# Patient Record
Sex: MALE | Race: WHITE | NOT HISPANIC OR LATINO | Employment: FULL TIME | URBAN - METROPOLITAN AREA
[De-identification: names, ages, dates, MRNs, and addresses within clinical notes are randomized per-mention and may not be internally consistent; named-entity substitution may affect disease eponyms.]

---

## 2017-05-24 ENCOUNTER — ALLSCRIPTS OFFICE VISIT (OUTPATIENT)
Dept: OTHER | Facility: OTHER | Age: 32
End: 2017-05-24

## 2017-06-19 ENCOUNTER — LAB CONVERSION - ENCOUNTER (OUTPATIENT)
Dept: OTHER | Facility: OTHER | Age: 32
End: 2017-06-19

## 2017-06-19 LAB
A/G RATIO (HISTORICAL): 1.6 (CALC) (ref 1–2.5)
ALBUMIN SERPL BCP-MCNC: 4.3 G/DL (ref 3.6–5.1)
ALP SERPL-CCNC: 48 U/L (ref 40–115)
ALT SERPL W P-5'-P-CCNC: 36 U/L (ref 9–46)
AST SERPL W P-5'-P-CCNC: 24 U/L (ref 10–40)
BACTERIA UR QL AUTO: NORMAL /HPF
BASOPHILS # BLD AUTO: 0.5 %
BASOPHILS # BLD AUTO: 30 CELLS/UL (ref 0–200)
BILIRUB SERPL-MCNC: 0.5 MG/DL (ref 0.2–1.2)
BILIRUB UR QL STRIP: NEGATIVE
BUN SERPL-MCNC: 16 MG/DL (ref 7–25)
BUN/CREA RATIO (HISTORICAL): NORMAL (CALC) (ref 6–22)
CALCIUM SERPL-MCNC: 9.4 MG/DL (ref 8.6–10.3)
CHLORIDE SERPL-SCNC: 102 MMOL/L (ref 98–110)
CHOLEST SERPL-MCNC: 195 MG/DL (ref 125–200)
CHOLEST/HDLC SERPL: 5.9 (CALC)
CO2 SERPL-SCNC: 26 MMOL/L (ref 20–31)
COLOR UR: YELLOW
COMMENT (HISTORICAL): CLEAR
CREAT SERPL-MCNC: 1.01 MG/DL (ref 0.6–1.35)
DEPRECATED RDW RBC AUTO: 14.9 % (ref 11–15)
EGFR AFRICAN AMERICAN (HISTORICAL): 114 ML/MIN/1.73M2
EGFR-AMERICAN CALC (HISTORICAL): 98 ML/MIN/1.73M2
EOSINOPHIL # BLD AUTO: 1.4 %
EOSINOPHIL # BLD AUTO: 84 CELLS/UL (ref 15–500)
FECAL OCCULT BLOOD DIAGNOSTIC (HISTORICAL): NEGATIVE
GAMMA GLOBULIN (HISTORICAL): 2.7 G/DL (CALC) (ref 1.9–3.7)
GLUCOSE (HISTORICAL): 95 MG/DL (ref 65–99)
GLUCOSE (HISTORICAL): NEGATIVE
HCT VFR BLD AUTO: 45.9 % (ref 38.5–50)
HDLC SERPL-MCNC: 33 MG/DL
HGB BLD-MCNC: 15.6 G/DL (ref 13.2–17.1)
HYALINE CASTS #/AREA URNS LPF: NORMAL /LPF
KETONES UR STRIP-MCNC: NEGATIVE MG/DL
LDL CHOLESTEROL (HISTORICAL): ABNORMAL MG/DL (CALC)
LDL CHOLESTEROL DIRECT (HISTORICAL): 91 MG/DL
LEUKOCYTE ESTERASE UR QL STRIP: NEGATIVE
LYME IGG/IGM AB (HISTORICAL): <0.9 INDEX
LYMPHOCYTES # BLD AUTO: 1554 CELLS/UL (ref 850–3900)
LYMPHOCYTES # BLD AUTO: 25.9 %
MCH RBC QN AUTO: 27.9 PG (ref 27–33)
MCHC RBC AUTO-ENTMCNC: 34 G/DL (ref 32–36)
MCV RBC AUTO: 82.1 FL (ref 80–100)
MONOCYTES # BLD AUTO: 534 CELLS/UL (ref 200–950)
MONOCYTES (HISTORICAL): 8.9 %
NEUTROPHILS # BLD AUTO: 3798 CELLS/UL (ref 1500–7800)
NEUTROPHILS # BLD AUTO: 63.3 %
NITRITE UR QL STRIP: NEGATIVE
NON-HDL-CHOL (CHOL-HDL) (HISTORICAL): 162 MG/DL (CALC)
PH UR STRIP.AUTO: 6 [PH] (ref 5–8)
PLATELET # BLD AUTO: 195 THOUSAND/UL (ref 140–400)
PMV BLD AUTO: 9 FL (ref 7.5–12.5)
POTASSIUM SERPL-SCNC: 4.5 MMOL/L (ref 3.5–5.3)
PROT UR STRIP-MCNC: NEGATIVE MG/DL
RBC # BLD AUTO: 5.59 MILLION/UL (ref 4.2–5.8)
RBC (HISTORICAL): NORMAL /HPF
SODIUM SERPL-SCNC: 136 MMOL/L (ref 135–146)
SP GR UR STRIP.AUTO: 1.01 (ref 1–1.03)
SQUAMOUS EPITHELIAL CELLS (HISTORICAL): NORMAL /HPF
TOTAL PROTEIN (HISTORICAL): 7 G/DL (ref 6.1–8.1)
TRIGL SERPL-MCNC: 423 MG/DL
TSH SERPL DL<=0.05 MIU/L-ACNC: 2.89 MIU/L (ref 0.4–4.5)
WBC # BLD AUTO: 6 THOUSAND/UL (ref 3.8–10.8)
WBC # BLD AUTO: NORMAL /HPF

## 2017-06-22 ENCOUNTER — ALLSCRIPTS OFFICE VISIT (OUTPATIENT)
Dept: OTHER | Facility: OTHER | Age: 32
End: 2017-06-22

## 2017-06-22 LAB — S PYO AG THROAT QL: POSITIVE

## 2017-08-02 ENCOUNTER — ALLSCRIPTS OFFICE VISIT (OUTPATIENT)
Dept: OTHER | Facility: OTHER | Age: 32
End: 2017-08-02

## 2017-09-20 ENCOUNTER — ALLSCRIPTS OFFICE VISIT (OUTPATIENT)
Dept: OTHER | Facility: OTHER | Age: 32
End: 2017-09-20

## 2018-01-12 VITALS
TEMPERATURE: 98.5 F | BODY MASS INDEX: 42.66 KG/M2 | WEIGHT: 315 LBS | SYSTOLIC BLOOD PRESSURE: 128 MMHG | HEART RATE: 68 BPM | HEIGHT: 72 IN | DIASTOLIC BLOOD PRESSURE: 84 MMHG | RESPIRATION RATE: 16 BRPM

## 2018-01-13 VITALS
RESPIRATION RATE: 16 BRPM | SYSTOLIC BLOOD PRESSURE: 110 MMHG | HEART RATE: 80 BPM | BODY MASS INDEX: 42.66 KG/M2 | DIASTOLIC BLOOD PRESSURE: 88 MMHG | WEIGHT: 315 LBS | HEIGHT: 72 IN

## 2018-01-13 VITALS
RESPIRATION RATE: 16 BRPM | TEMPERATURE: 98.4 F | HEIGHT: 72 IN | WEIGHT: 315 LBS | SYSTOLIC BLOOD PRESSURE: 122 MMHG | DIASTOLIC BLOOD PRESSURE: 88 MMHG | BODY MASS INDEX: 42.66 KG/M2 | HEART RATE: 76 BPM

## 2018-01-14 VITALS
RESPIRATION RATE: 16 BRPM | HEART RATE: 80 BPM | DIASTOLIC BLOOD PRESSURE: 84 MMHG | HEIGHT: 73 IN | SYSTOLIC BLOOD PRESSURE: 122 MMHG | WEIGHT: 315 LBS | BODY MASS INDEX: 41.75 KG/M2

## 2018-02-12 DIAGNOSIS — E78.5 HYPERLIPIDEMIA: ICD-10-CM

## 2018-11-02 ENCOUNTER — OFFICE VISIT (OUTPATIENT)
Dept: FAMILY MEDICINE CLINIC | Facility: CLINIC | Age: 33
End: 2018-11-02
Payer: COMMERCIAL

## 2018-11-02 VITALS
BODY MASS INDEX: 33.29 KG/M2 | SYSTOLIC BLOOD PRESSURE: 120 MMHG | WEIGHT: 251.2 LBS | DIASTOLIC BLOOD PRESSURE: 86 MMHG | TEMPERATURE: 98.4 F | RESPIRATION RATE: 16 BRPM | HEART RATE: 70 BPM | HEIGHT: 73 IN

## 2018-11-02 DIAGNOSIS — J02.9 SORE THROAT: ICD-10-CM

## 2018-11-02 DIAGNOSIS — J02.0 STREP PHARYNGITIS: Primary | ICD-10-CM

## 2018-11-02 LAB — S PYO AG THROAT QL: POSITIVE

## 2018-11-02 PROCEDURE — 3008F BODY MASS INDEX DOCD: CPT | Performed by: PHYSICIAN ASSISTANT

## 2018-11-02 PROCEDURE — 87880 STREP A ASSAY W/OPTIC: CPT | Performed by: PHYSICIAN ASSISTANT

## 2018-11-02 PROCEDURE — 99213 OFFICE O/P EST LOW 20 MIN: CPT | Performed by: PHYSICIAN ASSISTANT

## 2018-11-02 RX ORDER — AZITHROMYCIN 250 MG/1
TABLET, FILM COATED ORAL
Qty: 6 TABLET | Refills: 0 | Status: SHIPPED | OUTPATIENT
Start: 2018-11-02 | End: 2018-11-06

## 2018-11-02 NOTE — PROGRESS NOTES
Assessment/Plan:    1  Strep pharyngitis    - t/c positive for strep, gargle with warm salt water, advil as needed, zpack as directed with food  - azithromycin (ZITHROMAX) 250 mg tablet; Take 2 tabs today and 1 tab for the next 4 days with food  Dispense: 6 tablet; Refill: 0  - POCT rapid strepA  - Throat culture    F/u as needed      Subjective:   Chief Complaint   Patient presents with    Sore Throat    Fever     over 100 last night       Patient ID: Kishor Joaquin is a 34 y o  male  Patient here c/o sore throat and fever starting last night  Temp last night 100-101 F  Slight nasal congestion  Using Afrin  L ear aches  Took day Quil and Ny Quil with some relief  The following portions of the patient's history were reviewed and updated as appropriate: allergies, current medications, past family history, past medical history, past social history, past surgical history and problem list     History reviewed  No pertinent past medical history  Past Surgical History:   Procedure Laterality Date    KNEE SURGERY      right knee ACL reconstruction 1999     Family History   Problem Relation Age of Onset    Appendicitis Mother    [facility] Breast cancer Mother     Other Mother         Kidney transplanted    Diabetes Other      Social History     Social History    Marital status: /Civil Union     Spouse name: N/A    Number of children: N/A    Years of education: N/A     Occupational History    Not on file  Social History Main Topics    Smoking status: Former Smoker    Smokeless tobacco: Never Used    Alcohol use Yes      Comment: drinks beer 1-2 times a week     Drug use: No    Sexual activity: Not on file     Other Topics Concern    Not on file     Social History Narrative    Always uses seat belt    Daily caffeine consumption 1 serving a day    Has smoke detectors         No current outpatient prescriptions on file      Review of Systems          Objective:    Vitals:    11/02/18 1442 BP: 120/86   BP Location: Right arm   Patient Position: Sitting   Cuff Size: Standard   Pulse: 70   Resp: 16   Temp: 98 4 °F (36 9 °C)   TempSrc: Oral   Weight: 114 kg (251 lb 3 2 oz)   Height: 6' 1" (1 854 m)        Physical Exam   Constitutional: He is oriented to person, place, and time  He appears well-developed and well-nourished  HENT:   Head: Normocephalic and atraumatic  Right Ear: Tympanic membrane, external ear and ear canal normal    Left Ear: Tympanic membrane, external ear and ear canal normal    Nose: Nose normal    Mouth/Throat: Uvula is midline  Oropharyngeal exudate and posterior oropharyngeal erythema present  No tonsillar abscesses  Neck: Neck supple  Cardiovascular: Normal rate, regular rhythm and normal heart sounds  Pulmonary/Chest: Effort normal and breath sounds normal    Lymphadenopathy:     He has cervical adenopathy  Neurological: He is alert and oriented to person, place, and time  Psychiatric: He has a normal mood and affect

## 2018-11-02 NOTE — LETTER
November 2, 2018     Patient: Samir Choi   YOB: 1985   Date of Visit: 11/2/2018       To Whom it May Concern:    Emmett Whitney is under my professional care  He was seen in my office on 11/2/2018  He may return to work on 11/5/2018  If you have any questions or concerns, please don't hesitate to call           Sincerely,          Kell Cain PA-C        CC: No Recipients

## 2018-12-02 ENCOUNTER — OFFICE VISIT (OUTPATIENT)
Dept: URGENT CARE | Facility: CLINIC | Age: 33
End: 2018-12-02
Payer: COMMERCIAL

## 2018-12-02 VITALS
HEART RATE: 77 BPM | BODY MASS INDEX: 34.57 KG/M2 | TEMPERATURE: 97.4 F | DIASTOLIC BLOOD PRESSURE: 72 MMHG | SYSTOLIC BLOOD PRESSURE: 127 MMHG | RESPIRATION RATE: 20 BRPM | HEIGHT: 73 IN | OXYGEN SATURATION: 96 % | WEIGHT: 260.8 LBS

## 2018-12-02 DIAGNOSIS — H66.002 ACUTE SUPPURATIVE OTITIS MEDIA OF LEFT EAR WITHOUT SPONTANEOUS RUPTURE OF TYMPANIC MEMBRANE, RECURRENCE NOT SPECIFIED: Primary | ICD-10-CM

## 2018-12-02 PROCEDURE — 99213 OFFICE O/P EST LOW 20 MIN: CPT | Performed by: PHYSICIAN ASSISTANT

## 2018-12-02 RX ORDER — CLARITHROMYCIN 500 MG/1
500 TABLET, COATED ORAL EVERY 12 HOURS SCHEDULED
Qty: 20 TABLET | Refills: 0 | Status: SHIPPED | OUTPATIENT
Start: 2018-12-02 | End: 2018-12-12

## 2018-12-02 RX ORDER — FLUTICASONE PROPIONATE 50 MCG
1 SPRAY, SUSPENSION (ML) NASAL DAILY
Qty: 1 BOTTLE | Refills: 2 | Status: SHIPPED | OUTPATIENT
Start: 2018-12-02 | End: 2018-12-26 | Stop reason: SDUPTHER

## 2018-12-02 NOTE — PROGRESS NOTES
[facility]        NAME: Heidy Jimenez is a 34 y o  male  : 1985    MRN: [id number]  DATE: 2018  TIME: 11:17 AM    Assessment and Plan   Acute suppurative otitis media of left ear without spontaneous rupture of tympanic membrane, recurrence not specified [H66 002]  1  Acute suppurative otitis media of left ear without spontaneous rupture of tympanic membrane, recurrence not specified  clarithromycin (BIAXIN) 500 mg tablet    fluticasone (FLONASE) 50 mcg/act nasal spray         Patient Instructions     Take biaxin twice daily for 10 days  Take with food and eat yogurt or a probiotic daily to decrease GI upset  Flonase daily  Sinus rinses, tylenol, and motrin  Follow up with PCP in 3-5 days  Proceed to  ER if symptoms worsen  Chief Complaint     Chief Complaint   Patient presents with    Sinusitis     patient reports he has had sinus pressure/pain, nasal drainage,bilaterral ear pain for the last 2 days, took dayquil last pm  no fever or chills  History of Present Illness       This is a 34year old male presenting for URI symptoms x 2 days  He reports history of frequent sinus infections and this feels the same  He has sinus congestion, rhinorrhea, facial pain over his maxillary sinuses, flushing over the sinuses  Bilateral ear pain and sore throat, mild dry cough  No fevers  He took Dayquil yesterday which helped his symptoms  Review of Systems   Review of Systems   Constitutional: Positive for chills and fatigue  Negative for fever  HENT: Positive for congestion, ear pain, postnasal drip, rhinorrhea, sinus pain, sinus pressure and sore throat  Negative for ear discharge  Respiratory: Positive for cough  Negative for shortness of breath  Gastrointestinal: Negative for abdominal pain, diarrhea, nausea and vomiting  Musculoskeletal: Negative for myalgias  Skin: Negative for rash  Neurological: Positive for headaches  Negative for dizziness  Current Medications       Current Outpatient Prescriptions:     clarithromycin (BIAXIN) 500 mg tablet, Take 1 tablet (500 mg total) by mouth every 12 (twelve) hours for 10 days, Disp: 20 tablet, Rfl: 0    fluticasone (FLONASE) 50 mcg/act nasal spray, 1 spray into each nostril daily, Disp: 1 Bottle, Rfl: 2    Current Allergies     Allergies as of 12/02/2018 - Reviewed 12/02/2018   Allergen Reaction Noted    Shellfish allergy Anaphylaxis 05/24/2017    Amoxicillin Hives 05/24/2017    Pollen extract Allergic Rhinitis 05/24/2017            The following portions of the patient's history were reviewed and updated as appropriate: allergies, current medications, past family history, past medical history, past social history, past surgical history and problem list      Past Medical History:   Diagnosis Date    Allergic        Past Surgical History:   Procedure Laterality Date    KNEE SURGERY      right knee ACL reconstruction 1999       Family History   Problem Relation Age of Onset    Appendicitis Mother     Breast cancer Mother     Other Mother         Kidney transplanted    Diabetes Other          Medications have been verified  Objective   /72   Pulse 77   Temp (!) 97 4 °F (36 3 °C)   Resp 20   Ht 6' 1" (1 854 m)   Wt 118 kg (260 lb 12 8 oz)   SpO2 96%   BMI 34 41 kg/m²        Physical Exam     Physical Exam   Constitutional: He appears well-developed and well-nourished  No distress  HENT:   Head: Normocephalic  Right Ear: Tympanic membrane, external ear and ear canal normal    Left Ear: Ear canal normal  Tympanic membrane is injected, erythematous and bulging  A middle ear effusion is present  Nose: Mucosal edema and rhinorrhea present  Right sinus exhibits maxillary sinus tenderness  Right sinus exhibits no frontal sinus tenderness  Left sinus exhibits maxillary sinus tenderness  Left sinus exhibits no frontal sinus tenderness     Mouth/Throat: Uvula is midline and mucous membranes are normal  Posterior oropharyngeal erythema (mild posterior pharyngeal erythema without edema  + PND) present  No oropharyngeal exudate or posterior oropharyngeal edema  Left TM is erythematous, bulging, + mid ear effusion  Eyes: Pupils are equal, round, and reactive to light  Conjunctivae and EOM are normal    Neck: Normal range of motion  Neck supple  Cardiovascular: Normal rate, regular rhythm, normal heart sounds and intact distal pulses  Pulmonary/Chest: Effort normal and breath sounds normal  No respiratory distress  He has no decreased breath sounds  He has no wheezes  He has no rhonchi  He has no rales  Lymphadenopathy:     He has no cervical adenopathy  Neurological: He is alert  Skin: Skin is warm and dry  He is not diaphoretic  Nursing note and vitals reviewed

## 2018-12-26 ENCOUNTER — OFFICE VISIT (OUTPATIENT)
Dept: URGENT CARE | Facility: CLINIC | Age: 33
End: 2018-12-26
Payer: COMMERCIAL

## 2018-12-26 VITALS
OXYGEN SATURATION: 96 % | BODY MASS INDEX: 33.93 KG/M2 | RESPIRATION RATE: 16 BRPM | HEIGHT: 73 IN | DIASTOLIC BLOOD PRESSURE: 77 MMHG | HEART RATE: 77 BPM | WEIGHT: 256 LBS | SYSTOLIC BLOOD PRESSURE: 138 MMHG | TEMPERATURE: 97.7 F

## 2018-12-26 DIAGNOSIS — H66.002 ACUTE SUPPURATIVE OTITIS MEDIA OF LEFT EAR WITHOUT SPONTANEOUS RUPTURE OF TYMPANIC MEMBRANE, RECURRENCE NOT SPECIFIED: ICD-10-CM

## 2018-12-26 DIAGNOSIS — J06.9 VIRAL URI WITH COUGH: Primary | ICD-10-CM

## 2018-12-26 PROCEDURE — 99213 OFFICE O/P EST LOW 20 MIN: CPT | Performed by: PHYSICIAN ASSISTANT

## 2018-12-26 RX ORDER — FLUTICASONE PROPIONATE 50 MCG
1 SPRAY, SUSPENSION (ML) NASAL DAILY
Qty: 1 BOTTLE | Refills: 0 | Status: SHIPPED | OUTPATIENT
Start: 2018-12-26 | End: 2020-02-05

## 2018-12-26 NOTE — PROGRESS NOTES
[facility]        NAME: Yosvany Barrientos is a 28 y o  male  : 1985    MRN: [id number]  DATE: 2018  TIME: 5:10 PM    Assessment and Plan   Viral URI with cough [J06 9, B97 89]  1  Viral URI with cough     2  Acute suppurative otitis media of left ear without spontaneous rupture of tympanic membrane, recurrence not specified  fluticasone (FLONASE) 50 mcg/act nasal spray         Patient Instructions       FolloFlonase daily for sinus congestion  Increase fluid intake  Robotussin, sudafed, humidified air for symptomatic relief  Watch for fevers  w up with PCP in 3-5 days  Proceed to  ER if symptoms worsen  Chief Complaint     Chief Complaint   Patient presents with    Cold Like Symptoms     pt was here in the beginning of december for sinus inf and ear infection and prescribed flonase & ABT  Yesterday pt used contaminated flonase bottle and now has blocked sinuses, clogged ears b/l and a productive cough  History of Present Illness       This is a 28year old male presenting for URI symptoms x 2 days  He was seen here earlier this month by me and treated for otitis media  He reports that yesterday he was having some mild sinus congestion, rhinorrhea, and watery eyes and he used his old bottle of flonase  He states that he feels the bottle is contaminated and he gave himself another sinus infection  He is now having sinus congestion, clogged ear sensation, sore throat, and cough  No shortness of breath or fevers  He notes that his daughter is also sick at home for the past 3-4 days  Review of Systems   Review of Systems   Constitutional: Positive for fatigue  Negative for fever  HENT: Positive for congestion, ear pain, postnasal drip, rhinorrhea, sinus pressure and sore throat  Negative for ear discharge  Respiratory: Positive for cough  Negative for shortness of breath and wheezing      Gastrointestinal: Negative for abdominal pain, diarrhea, nausea and vomiting  Skin: Negative for rash  Neurological: Positive for headaches  Current Medications       Current Outpatient Prescriptions:     fluticasone (FLONASE) 50 mcg/act nasal spray, 1 spray into each nostril daily, Disp: 1 Bottle, Rfl: 0    Current Allergies     Allergies as of 12/26/2018 - Reviewed 12/26/2018   Allergen Reaction Noted    Shellfish allergy Anaphylaxis 05/24/2017    Amoxicillin Hives 05/24/2017    Pollen extract Allergic Rhinitis 05/24/2017            The following portions of the patient's history were reviewed and updated as appropriate: allergies, current medications, past family history, past medical history, past social history, past surgical history and problem list      Past Medical History:   Diagnosis Date    Allergic        Past Surgical History:   Procedure Laterality Date    KNEE SURGERY      right knee ACL reconstruction 1999       Family History   Problem Relation Age of Onset    Appendicitis Mother     Breast cancer Mother     Other Mother         Kidney transplanted    No Known Problems Father     Diabetes Other          Medications have been verified  Objective   /77 (BP Location: Left arm, Patient Position: Sitting)   Pulse 77   Temp 97 7 °F (36 5 °C) (Tympanic)   Resp 16   Ht 6' 1" (1 854 m)   Wt 116 kg (256 lb)   SpO2 96%   BMI 33 78 kg/m²        Physical Exam     Physical Exam   Constitutional: He appears well-developed and well-nourished  No distress  HENT:   Head: Normocephalic  Right Ear: Tympanic membrane, external ear and ear canal normal    Left Ear: External ear and ear canal normal    Nose: Mucosal edema and rhinorrhea present  Mouth/Throat: Uvula is midline and mucous membranes are normal  Posterior oropharyngeal erythema (mild) present  No oropharyngeal exudate or posterior oropharyngeal edema  Eyes: Conjunctivae are normal    Neck: Normal range of motion  Neck supple     Cardiovascular: Normal rate, regular rhythm, normal heart sounds and intact distal pulses  Pulmonary/Chest: Effort normal and breath sounds normal  No respiratory distress  He has no decreased breath sounds  He has no wheezes  He has no rhonchi  He has no rales  Lymphadenopathy:     He has no cervical adenopathy  Neurological: He is alert  Skin: Skin is warm and dry  He is not diaphoretic  Nursing note and vitals reviewed

## 2019-02-25 ENCOUNTER — OFFICE VISIT (OUTPATIENT)
Dept: FAMILY MEDICINE CLINIC | Facility: CLINIC | Age: 34
End: 2019-02-25
Payer: COMMERCIAL

## 2019-02-25 VITALS
HEART RATE: 80 BPM | RESPIRATION RATE: 16 BRPM | WEIGHT: 272.1 LBS | TEMPERATURE: 99.2 F | HEIGHT: 72 IN | DIASTOLIC BLOOD PRESSURE: 60 MMHG | BODY MASS INDEX: 36.85 KG/M2 | SYSTOLIC BLOOD PRESSURE: 110 MMHG

## 2019-02-25 DIAGNOSIS — R05.9 COUGH: ICD-10-CM

## 2019-02-25 DIAGNOSIS — J02.0 STREP PHARYNGITIS: Primary | ICD-10-CM

## 2019-02-25 LAB — S PYO AG THROAT QL: NEGATIVE

## 2019-02-25 PROCEDURE — 1036F TOBACCO NON-USER: CPT | Performed by: FAMILY MEDICINE

## 2019-02-25 PROCEDURE — 99214 OFFICE O/P EST MOD 30 MIN: CPT | Performed by: FAMILY MEDICINE

## 2019-02-25 PROCEDURE — 3008F BODY MASS INDEX DOCD: CPT | Performed by: FAMILY MEDICINE

## 2019-02-25 PROCEDURE — 87880 STREP A ASSAY W/OPTIC: CPT | Performed by: FAMILY MEDICINE

## 2019-02-25 RX ORDER — CEFUROXIME AXETIL 500 MG/1
500 TABLET ORAL EVERY 12 HOURS SCHEDULED
Qty: 20 TABLET | Refills: 0 | Status: SHIPPED | OUTPATIENT
Start: 2019-02-25 | End: 2019-03-07

## 2019-02-25 NOTE — PATIENT INSTRUCTIONS
Patient clinically a strep pharyngitis  Patient will take Ceftin twice a day for 10 days  If he has any kind a hives he will let us know but I doubt that he will from his penicillin allergy  He should get better in the next 48-72 hours if he gets worse he will let us now    Recheck as scheduled or needed

## 2019-02-25 NOTE — PROGRESS NOTES
Assessment/Plan:    Patient clinically a strep pharyngitis  Patient will take Ceftin twice a day for 10 days  If he has any kind a hives he will let us know but I doubt that he will from his penicillin allergy  He should get better in the next 48-72 hours if he gets worse he will let us now    Recheck as scheduled or needed         Subjective:   Kishor Joaquin is a 28 y o male  Chief Complaint   Patient presents with    Cold Like Symptoms    Cough     Patient woke up yesterday with a sore throat had all day became worse as the day went on  Last night he had stomach pain and shaking rigors and chills  He went to sleep last night woke up so felt okay but he had a cough now  He went to work any was sent home because he looks so sick  No he has chest pain with pain on the sides from his ribs and he feels miserable  He the biggest things bothering him now our the pain the chest cough and his ears popping  Still with a fever low grade today 99 but shaking rigors earlier in the day      Past Medical History:   Diagnosis Date    Allergic      Social History     Tobacco Use    Smoking status: Former Smoker    Smokeless tobacco: Never Used   Substance Use Topics    Alcohol use: Yes     Comment: drinks beer 1-2 times a week     Drug use: No     Family History   Problem Relation Age of Onset    Appendicitis Mother     Breast cancer Mother     Other Mother         Kidney transplanted    No Known Problems Father     Diabetes Other     Alcohol abuse Neg Hx     Substance Abuse Neg Hx     Mental illness Neg Hx        MEDICATIONS REVIEWED AND UPDATED    Rest Of 10 Point Review Of System Negative    Objective:    Vitals:    02/25/19 1449   BP: 110/60   Pulse: 80   Resp: 16   Temp: 99 2 °F (37 3 °C)     Body mass index is 36 9 kg/m²      Physical Exam  Constitutional  Patient is fatigued in no acute distress    Mental Status  Alert, Oriented, Cooperative, Memory function normal , clean, and reasonable    HEENT  TMs are clear turbinates open red pharynx beefy red questionable odor no exudate    Neck  No neck mass, No thyromegaly, Good carotid upstrokes bilaterally, trachea midline positive click    Respiratory  Breath sounds normal, No rales, No rhonchi, No wheezing, normal palpation    Cardiac   Regular rhythm without ectopy or murmur no S3-S4, no heave lift or thrill to palpation    Vascular  No leg edema, No pedal edema    Muscular skeletal  No clubbing cyanosis , muscle tone normal    Skin  No appreciable rashes or abnormal appearing lesions

## 2019-02-27 LAB — B-HEM STREP SPEC QL CULT: NEGATIVE

## 2020-02-05 ENCOUNTER — APPOINTMENT (EMERGENCY)
Dept: RADIOLOGY | Facility: HOSPITAL | Age: 35
End: 2020-02-05
Payer: COMMERCIAL

## 2020-02-05 ENCOUNTER — HOSPITAL ENCOUNTER (EMERGENCY)
Facility: HOSPITAL | Age: 35
Discharge: HOME | End: 2020-02-05
Attending: EMERGENCY MEDICINE
Payer: COMMERCIAL

## 2020-02-05 ENCOUNTER — OFFICE VISIT (OUTPATIENT)
Dept: FAMILY MEDICINE CLINIC | Facility: CLINIC | Age: 35
End: 2020-02-05
Payer: COMMERCIAL

## 2020-02-05 VITALS
TEMPERATURE: 98 F | SYSTOLIC BLOOD PRESSURE: 135 MMHG | RESPIRATION RATE: 17 BRPM | OXYGEN SATURATION: 95 % | DIASTOLIC BLOOD PRESSURE: 75 MMHG | HEART RATE: 65 BPM

## 2020-02-05 VITALS
SYSTOLIC BLOOD PRESSURE: 120 MMHG | TEMPERATURE: 98.3 F | HEART RATE: 84 BPM | RESPIRATION RATE: 15 BRPM | WEIGHT: 299.6 LBS | DIASTOLIC BLOOD PRESSURE: 76 MMHG | BODY MASS INDEX: 40.58 KG/M2 | HEIGHT: 72 IN

## 2020-02-05 DIAGNOSIS — R51.9 NONINTRACTABLE HEADACHE, UNSPECIFIED CHRONICITY PATTERN, UNSPECIFIED HEADACHE TYPE: Primary | ICD-10-CM

## 2020-02-05 DIAGNOSIS — F32.1 CURRENT MODERATE EPISODE OF MAJOR DEPRESSIVE DISORDER WITHOUT PRIOR EPISODE (HCC): ICD-10-CM

## 2020-02-05 DIAGNOSIS — G43.909 MIGRAINE: Primary | ICD-10-CM

## 2020-02-05 DIAGNOSIS — E66.01 MORBID OBESITY WITH BMI OF 40.0-44.9, ADULT (HCC): ICD-10-CM

## 2020-02-05 DIAGNOSIS — H54.7 LOSS OF VISION: ICD-10-CM

## 2020-02-05 PROCEDURE — 99284 EMERGENCY DEPT VISIT MOD MDM: CPT | Performed by: EMERGENCY MEDICINE

## 2020-02-05 PROCEDURE — 99214 OFFICE O/P EST MOD 30 MIN: CPT | Performed by: PHYSICIAN ASSISTANT

## 2020-02-05 PROCEDURE — 70450 CT HEAD/BRAIN W/O DYE: CPT

## 2020-02-05 PROCEDURE — 1036F TOBACCO NON-USER: CPT | Performed by: PHYSICIAN ASSISTANT

## 2020-02-05 PROCEDURE — 99284 EMERGENCY DEPT VISIT MOD MDM: CPT

## 2020-02-05 PROCEDURE — 3008F BODY MASS INDEX DOCD: CPT | Performed by: PHYSICIAN ASSISTANT

## 2020-02-05 RX ADMIN — DEXAMETHASONE 10 MG: 2 TABLET ORAL at 15:50

## 2020-02-05 NOTE — ED NOTES
Patient ambulatory to perform visual acuity screening with no difficulty      Abimbola Graham RN  02/05/20 8995

## 2020-02-05 NOTE — PROGRESS NOTES
Assessment/Plan:    1  Nonintractable headache, unspecified chronicity pattern, unspecified headache type    - described as worst headache, with loss of vision and now dizziness will send to ER arin schumacher, advised to go home and have wife drive him    2  Loss of vision    - see above    3  Morbid obesity with BMI of 40 0-44 9, adult ([facility] [address] )    - encouraged patient to work on [address]  [employer], exercise  and adequate sleep for weight loss  Follow up if more help is needed    4  Depression    - follow up after ER to discuss treatment    F/u after ER  F/u as needed    Subjective:   Chief Complaint   Patient presents with    Headache    Dizziness    Visual disturbances      Patient ID: Joao Tinoco is a 27 y o  male  Saturday was at a conference and the lights were off and patient was having trouble with vision  Headache then started, behind left eye, took Tylenol went to bed then woke up and felt better then Sunday night headache returned around 5 pm, vision started blurry, weird vision squibbles, spots, trouble seeing  Sunday took advil and had a hot shower  "Felt weird"  Vision went out completely in right eye  Monday felt better had a mild headache, saw spots and resolved and headache only lasted an hour, again happened on Tuesday night at 5-6 pm and had spots in vision again and 10 minutes later headache started and he felt like he got hit by a freight train, head still swimming  Tried to get up to go to work but couldn't get in  No pain in head now  Equilibrium feels off  Trouble to focus  Feels like he has a concussion but didn't hit his head at all  Vision still blurry today  Has lost vision twice in right eye, eye even drooping partially shut per son last night  Headache "worst of his life"  Last migraine was probably late teens  Also admits for the past few months has been feeling down, no motivation, trouble getting out of bed, snapping at work and family  Wife thinks he is depressed   Would like to discuss this        The following portions of the patient's history were reviewed and updated as appropriate: allergies, current medications, past family history, past medical history, past social history, past surgical history and problem list     Past Medical History:   Diagnosis Date    Allergic      Past Surgical History:   Procedure Laterality Date    KNEE SURGERY      right knee ACL reconstruction 1999     Family History   Problem Relation Age of Onset    Appendicitis Mother     Breast cancer Mother     Other Mother         Kidney transplanted    No Known Problems Father     Diabetes Other     Alcohol abuse Neg Hx     Substance Abuse Neg Hx     Mental illness Neg Hx      Social History     Socioeconomic History    Marital status: /Civil Union     Spouse name: Not on file    Number of children: Not on file    Years of education: Not on file    Highest education level: Not on file   Occupational History    Not on file   Social Needs    Financial resource strain: Not on file    Food insecurity:     Worry: Not on file     Inability: Not on file    Transportation needs:     Medical: Not on file     Non-medical: Not on file   Tobacco Use    Smoking status: Former Smoker    Smokeless tobacco: Never Used   Substance and Sexual Activity    Alcohol use: Yes     Comment: drinks beer 1-2 times a week     Drug use: No    Sexual activity: Not on file   Lifestyle    Physical activity:     Days per week: Not on file     Minutes per session: Not on file    Stress: Not on file   Relationships    Social connections:     Talks on phone: Not on file     Gets together: Not on file     Attends Congregation service: Not on file     Active member of club or organization: Not on file     Attends meetings of clubs or organizations: Not on file     Relationship status: Not on file    Intimate partner violence:     Fear of current or ex partner: Not on file     Emotionally abused: Not on file Physically abused: Not on file     Forced sexual activity: Not on file   Other Topics Concern    Not on file   Social History Narrative    Always uses seat belt    Daily caffeine consumption 1 serving a day    Has smoke detectors       Current Outpatient Medications:     fluticasone (FLONASE) 50 mcg/act nasal spray, 1 spray into each nostril daily, Disp: 1 Bottle, Rfl: 0    Review of Systems          Objective:    Vitals:    02/05/20 1254   BP: 120/76   BP Location: Left arm   Patient Position: Sitting   Cuff Size: Large   Pulse: 84   Resp: 15   Temp: 98 3 °F (36 8 °C)   TempSrc: Oral   Weight: 136 kg (299 lb 9 6 oz)   Height: 5' 11 75" (1 822 m)        Physical Exam   Constitutional: He is oriented to person, place, and time  He appears well-developed and well-nourished  HENT:   Head: Normocephalic and atraumatic  Right Ear: Hearing, tympanic membrane, external ear and ear canal normal    Left Ear: Hearing, tympanic membrane, external ear and ear canal normal    Nose: Nose normal    Mouth/Throat: Oropharynx is clear and moist    Eyes: Pupils are equal, round, and reactive to light  Conjunctivae and EOM are normal    Neck: Normal range of motion  Neck supple  No thyromegaly present  Cardiovascular: Normal rate, regular rhythm, normal heart sounds and intact distal pulses  No murmur heard  Pulmonary/Chest: Effort normal and breath sounds normal  No respiratory distress  He has no wheezes  He has no rales  Musculoskeletal: Normal range of motion  He exhibits no edema  Lymphadenopathy:     He has no cervical adenopathy  Neurological: He is alert and oriented to person, place, and time  He has normal reflexes  He displays normal reflexes  No cranial nerve deficit or sensory deficit  He exhibits normal muscle tone  Coordination normal    Skin: Skin is warm and dry  Psychiatric: He has a normal mood and affect  Judgment normal              BMI Counseling: Body mass index is 40 92 kg/m²   The BMI is above normal  Nutrition recommendations include reducing portion sizes, decreasing overall calorie intake and 3-5 servings of fruits/vegetables daily  Exercise recommendations include moderate aerobic physical activity for 150 minutes/week  Depression Screening Follow-up Plan: Patient's depression screening was positive with a PHQ-2 score of 3  Their PHQ-9 score was 10  Patient assessed for underlying major depression  They have no active suicidal ideations  Brief counseling provided and recommend additional follow-up/re-evaluation next office visit

## 2020-02-05 NOTE — ED ATTENDING ATTESTATION
2/5/2020  Dread Masters DO, saw and evaluated the patient  I have discussed the patient with the resident/non-physician practitioner and agree with the resident's/non-physician practitioner's findings, Plan of Care, and MDM as documented in the resident's/non-physician practitioner's note, except where noted  All available labs and Radiology studies were reviewed  I was present for key portions of any procedure(s) performed by the resident/non-physician practitioner and I was immediately available to provide assistance  At this point I agree with the current assessment done in the Emergency Department  I have conducted an independent evaluation of this patient a history and physical is as follows:    26 yo male presents for evaluation of HA starting 2/1/2020 he has been having "migraine" HA nightly, associated with scotoma, HA for a few hours at a time  He had an episode where he felt like he lost vision in L eye, then resolved, the following day it was R eye, then the next day L eye again  States that symptoms improved with tylenol and water  Pt c/o mild HA at this time, but concerned because things seem "wavy"    Denies focal weakness/numbness/tingling  EOMI, PERRL  No dysmetria or dysdiadokinesia  No visual field cuts  No nystagmus  MS 5/5 all ext  CN II-XII grossly intact  Normal gait    Imp: multiple c/o likely relating to ophthalmic migraine plan: tx sx  Offer CT  Pt should f/u with neurology (dr Cr Cantu) for further eval and tx  May need MRI as outpt        ED Course         Critical Care Time  Procedures

## 2020-02-05 NOTE — ED PROVIDER NOTES
History  Chief Complaint   Patient presents with    Headache     Headache starting 5pm since Saturday along with seeing spots  Lost vision in left eye for several hours while having the headache  Had history of migraines when younger in his early 25s  69-year-old male past medical history including migraines in his 25s stating that approximately 3 4 days ago started to have migraines daily again at around the same time at night and has been taking over-the-counter pain medication and having to take naps for relief  Patient states that when he gets these migraines usually has some visual disturbances before it initially describing lighting waving objects in his vision as well as dots in his vision with intermittent visual loss in alternating eyes which immediately returns  Patient denies recent illness, fever, night sweats, chills, sore throat, cough, chest pain with shortness of breath, abdominal pain, vomiting, diarrhea constipation dysuria, hematuria  None       Past Medical History:   Diagnosis Date    Allergic        Past Surgical History:   Procedure Laterality Date    KNEE SURGERY      right knee ACL reconstruction 1999       Family History   Problem Relation Age of Onset    Appendicitis Mother     Other Mother         Kidney transplanted    Lupus Mother     Breast cancer Mother     Raynaud syndrome Mother     Kidney failure Mother     No Known Problems Father     Diabetes Other     Alcohol abuse Neg Hx     Substance Abuse Neg Hx     Mental illness Neg Hx      I have reviewed and agree with the history as documented  Social History     Tobacco Use    Smoking status: Former Smoker    Smokeless tobacco: Never Used   Substance Use Topics    Alcohol use: Yes     Comment: drinks beer 1-2 times a week     Drug use: No        Review of Systems   Constitutional: Negative  HENT: Negative  Eyes: Positive for visual disturbance  Negative for photophobia, pain and discharge  Respiratory: Negative  Cardiovascular: Negative  Gastrointestinal: Negative  Genitourinary: Negative  Musculoskeletal: Negative  Skin: Negative  Neurological: Positive for headaches  Negative for dizziness, seizures, syncope, weakness, light-headedness and numbness  Physical Exam  ED Triage Vitals [02/05/20 1422]   Temperature Pulse Respirations Blood Pressure SpO2   98 °F (36 7 °C) 80 17 149/93 98 %      Temp Source Heart Rate Source Patient Position - Orthostatic VS BP Location FiO2 (%)   Oral Monitor Sitting Left arm --      Pain Score       3             Orthostatic Vital Signs  Vitals:    02/05/20 1422 02/05/20 1557   BP: 149/93 152/67   Pulse: 80 59   Patient Position - Orthostatic VS: Sitting        Physical Exam    ED Medications  Medications   dexamethasone (DECADRON) tablet 10 mg (10 mg Oral Given 2/5/20 1550)       Diagnostic Studies  Results Reviewed     None                 CT head without contrast    (Results Pending)         Procedures  Procedures      ED Course                               MDM  Number of Diagnoses or Management Options  Diagnosis management comments: Will treat patient with Decadron as he does not have a headache at this time is prevention, will get a CT non-con at this time, discussed with patient and patient's family that likely these are migrainous in nature and that he will need to follow-up with neurology and the headache clinic as an outpatient  Will provide patient with information and referral         Disposition  Final diagnoses:   Migraine     Time reflects when diagnosis was documented in both MDM as applicable and the Disposition within this note     Time User Action Codes Description Comment    2/5/2020  4:36 PM Michael Alvarado Add [V09 [id number]] Migraine       ED Disposition     ED Disposition Condition Date/Time Comment    Discharge Stable Wed Feb 5, 2020  4:36 PM [facility] discharge to home/self care              Follow-up Information Follow up With Specialties Details Why Contact Info Additional Information    Hyun Wilkins PA-C Family Medicine, Physician Assistant Go to   [address] [facility], [facility] [address] Max Galvez MD Neurology Schedule an appointment as soon as possible for a visit   [facility] [phone]       [address] Emergency Department Emergency Medicine  As needed, If symptoms worsen [address] [place] [address]  [phone]  ED, [address] [place] [address], [address]   [phone]          Patient's Medications   Discharge Prescriptions    No medications on file         ED Provider  Attending physically available and evaluated Sergey Menard I managed the patient along with the ED Attending      Electronically Signed by         Mary Jefferson DO  02/05/20 1327

## 2020-02-05 NOTE — DISCHARGE INSTRUCTIONS
You came into the ED fr migraine type headaches over the past few days  After shared decision making, a CT head was performed which did not show any acute abnormality  Please follow up with neurology and call the below number to schedule an appointment  Please worsening for worsening headaches, fainting, or any other concerning signs or symptoms  Please refer to the following documents for additional instructions and return precautions

## 2021-07-06 ENCOUNTER — TELEMEDICINE (OUTPATIENT)
Dept: FAMILY MEDICINE CLINIC | Facility: CLINIC | Age: 36
End: 2021-07-06
Payer: COMMERCIAL

## 2021-07-06 VITALS — WEIGHT: 300 LBS | BODY MASS INDEX: 39.76 KG/M2 | HEIGHT: 73 IN

## 2021-07-06 DIAGNOSIS — Z20.822 SUSPECTED COVID-19 VIRUS INFECTION: Primary | ICD-10-CM

## 2021-07-06 PROCEDURE — U0005 INFEC AGEN DETEC AMPLI PROBE: HCPCS | Performed by: NURSE PRACTITIONER

## 2021-07-06 PROCEDURE — 1036F TOBACCO NON-USER: CPT | Performed by: NURSE PRACTITIONER

## 2021-07-06 PROCEDURE — 99213 OFFICE O/P EST LOW 20 MIN: CPT | Performed by: NURSE PRACTITIONER

## 2021-07-06 PROCEDURE — 3008F BODY MASS INDEX DOCD: CPT | Performed by: NURSE PRACTITIONER

## 2021-07-06 PROCEDURE — U0003 INFECTIOUS AGENT DETECTION BY NUCLEIC ACID (DNA OR RNA); SEVERE ACUTE RESPIRATORY SYNDROME CORONAVIRUS 2 (SARS-COV-2) (CORONAVIRUS DISEASE [COVID-19]), AMPLIFIED PROBE TECHNIQUE, MAKING USE OF HIGH THROUGHPUT TECHNOLOGIES AS DESCRIBED BY CMS-2020-01-R: HCPCS | Performed by: NURSE PRACTITIONER

## 2021-07-06 NOTE — LETTER
July 6, 2021     Patient: Lynda Pacheco   YOB: 1985   Date of Visit: 7/6/2021       To Whom it May Concern:    Harini Martínez is under my professional care  He was seen in my office on 7/6/2021  He may return to work on on Thursday 07/08/2021  He is to be excused today 07/06 & Wednesday 07/07  If you have any questions or concerns, please don't hesitate to call           Sincerely,          KATHERINE Alvarado        CC: No Recipients

## 2021-07-06 NOTE — PROGRESS NOTES
COVID-19 Outpatient Progress Note    Assessment/Plan:    Pt presents via televideo for symptoms of fever, productive cough, rhinorrhea, congestion & sore throat  He has been taking Nyquil & Dayquil and has noted some improvement  Pt is not vaccinated against covid  His kids are sick, but he is not sure if they had covid  Covid swab ordered  Patient encouraged to continue symptom management & maintain adequate oral fluid intake at home  Pt notified that our office will contact them once their results are final  In the meantime, pt is to self-isolate & quarantine at home until results final  Pt to call office if symptoms worsen  Pt states they understand & agree with treatment plan  Problem List Items Addressed This Visit     None      Visit Diagnoses     Suspected COVID-19 virus infection    -  Primary    Relevant Orders    Novel Coronavirus (COVID-19), PCR [facility] Collected in Office         Disposition:     I recommended self-quarantine for 10 days and to watch for symptoms until 14 days after exposure  If patient were to develop symptoms, they should self isolate and call our office for further guidance  I referred patient to one of our centralized sites for a COVID-19 swab  I have spent 15 minutes directly with the patient  Greater than 50% of this time was spent in counseling/coordination of care regarding: instructions for management and patient and family education  Encounter provider KATHERINE Morris    Provider located at [address] [place] [address]  [phone]    Recent Visits  No visits were found meeting these conditions    Showing recent visits within past 7 days and meeting all other requirements  Today's Visits  Date Type Provider Dept   07/06/21 Telemedicine KATHERINE Morris [address]   Showing today's visits and meeting all other requirements  Future Appointments  No visits were found meeting these conditions  Showing future appointments within next 150 days and meeting all other requirements     This virtual check-in was done via [employer] and patient was informed that this is a secure, HIPAA-compliant platform  He agrees to proceed  Patient agrees to participate in a virtual check in via telephone or video visit instead of presenting to the office to address urgent/immediate medical needs  Patient is aware this is a billable service  After connecting through [facility], the patient was identified by name and date of birth  Pedro Menendez was informed that this was a telemedicine visit and that the exam was being conducted confidentially over secure lines  My office door was closed  No one else was in the room  Pedro Menendez acknowledged consent and understanding of privacy and security of the telemedicine visit  I informed the patient that I have reviewed his record in Epic and presented the opportunity for him to ask any questions regarding the visit today  The patient agreed to participate  Subjective:   Pedro Menendez is a 38 y o  male who is concerned about COVID-19  Patient's symptoms include fever, nasal congestion, rhinorrhea, sore throat and cough  Patient denies chills, fatigue, malaise, anosmia, loss of taste, shortness of breath, chest tightness, abdominal pain, nausea, vomiting, diarrhea, myalgias and headaches       Date of symptom onset: 7/3/2021    Exposure:   Contact with a person who is under investigation (PUI) for or who is positive for COVID-19 within the last 14 days?: No    Hospitalized recently for fever and/or lower respiratory symptoms?: No      Currently a healthcare worker that is involved in direct patient care?: No      Works in a special setting where the risk of COVID-19 transmission may be high? (this may include long-term care, correctional and [facility] facilities; homeless shelters; assisted-living facilities and group homes ): No      Resident in a special setting where the risk of COVID-19 transmission may be high? (this may include long-term care, correctional and care home facilities; homeless shelters; assisted-living facilities and group homes ): No      Pt presents via televideo for symptoms of fever, productive cough, rhinorrhea, congestion & sore throat  He has been taking Nyquil & Dayquil and has noted some improvement  Pt is not vaccinated against covid  His kids are sick, but he is not sure if they had covid  No results found for: Aylin Samson, ADRIANA, [address]  Past Medical History:   Diagnosis Date    Allergic      Past Surgical History:   Procedure Laterality Date    KNEE SURGERY      right knee ACL reconstruction 1999     No current outpatient medications on file  No current facility-administered medications for this visit  Allergies   Allergen Reactions    Shellfish Allergy - Food Allergy Anaphylaxis     Category: Allergy;     Amoxicillin Hives     Category: Allergy;     Pollen Extract Allergic Rhinitis       Review of Systems   Constitutional: Positive for fever  Negative for chills and fatigue  HENT: Positive for congestion, postnasal drip, rhinorrhea and sore throat  Negative for ear discharge, ear pain, sinus pressure and sinus pain  Respiratory: Positive for cough  Negative for chest tightness, shortness of breath and wheezing  Cardiovascular: Negative  Negative for chest pain  Gastrointestinal: Negative for abdominal pain, diarrhea, nausea and vomiting  Musculoskeletal: Negative  Negative for myalgias  Neurological: Negative  Negative for dizziness and headaches  Psychiatric/Behavioral: Negative  Objective:    Vitals:    07/06/21 0845   Weight: 136 kg (300 lb)   Height: 6' 1" (1 854 m)       Physical Exam  Constitutional:       Appearance: Normal appearance  HENT:      Head: Normocephalic  Pulmonary:      Effort: Pulmonary effort is normal    Neurological:      General: No focal deficit present  Mental Status: He is alert and oriented to person, place, and time  Mental status is at baseline  Psychiatric:         Mood and Affect: Mood normal          Behavior: Behavior normal          Thought Content: Thought content normal          Judgment: Judgment normal        VIRTUAL VISIT DISCLAIMER    Michaelmadelyn Woo acknowledges that he has consented to an online visit or consultation  He understands that the online visit is based solely on information provided by him, and that, in the absence of a face-to-face physical evaluation by the physician, the diagnosis he receives is both limited and provisional in terms of accuracy and completeness  This is not intended to replace a full medical face-to-face evaluation by the physician  Michael Woo understands and accepts these terms

## 2021-07-07 ENCOUNTER — TELEPHONE (OUTPATIENT)
Dept: FAMILY MEDICINE CLINIC | Facility: CLINIC | Age: 36
End: 2021-07-07

## 2021-07-07 LAB — SARS-COV-2 RNA RESP QL NAA+PROBE: NEGATIVE

## 2021-07-07 NOTE — TELEPHONE ENCOUNTER
Pt would like to know what to do now that his COVID test was negative  He feels he has bronchitis and is anxious to have it treated  Can you please follow up with him tomorrow morning and let him know how to proceed?

## 2021-07-08 DIAGNOSIS — R05.9 COUGH: Primary | ICD-10-CM

## 2021-07-08 DIAGNOSIS — R09.81 NASAL CONGESTION: ICD-10-CM

## 2021-07-08 RX ORDER — PREDNISONE 10 MG/1
TABLET ORAL
Qty: 20 TABLET | Refills: 0 | Status: SHIPPED | OUTPATIENT
Start: 2021-07-08 | End: 2022-01-03 | Stop reason: ALTCHOICE

## 2021-07-08 RX ORDER — ALBUTEROL SULFATE 90 UG/1
2 AEROSOL, METERED RESPIRATORY (INHALATION) EVERY 6 HOURS PRN
Qty: 6.7 G | Refills: 0 | Status: SHIPPED | OUTPATIENT
Start: 2021-07-08 | End: 2022-01-03 | Stop reason: ALTCHOICE

## 2022-01-03 ENCOUNTER — TELEMEDICINE (OUTPATIENT)
Dept: FAMILY MEDICINE CLINIC | Facility: CLINIC | Age: 37
End: 2022-01-03
Payer: COMMERCIAL

## 2022-01-03 DIAGNOSIS — R09.81 SINUS CONGESTION: Primary | ICD-10-CM

## 2022-01-03 DIAGNOSIS — B34.9 VIRAL INFECTION, UNSPECIFIED: ICD-10-CM

## 2022-01-03 PROCEDURE — 3725F SCREEN DEPRESSION PERFORMED: CPT | Performed by: PHYSICIAN ASSISTANT

## 2022-01-03 PROCEDURE — U0003 INFECTIOUS AGENT DETECTION BY NUCLEIC ACID (DNA OR RNA); SEVERE ACUTE RESPIRATORY SYNDROME CORONAVIRUS 2 (SARS-COV-2) (CORONAVIRUS DISEASE [COVID-19]), AMPLIFIED PROBE TECHNIQUE, MAKING USE OF HIGH THROUGHPUT TECHNOLOGIES AS DESCRIBED BY CMS-2020-01-R: HCPCS | Performed by: PHYSICIAN ASSISTANT

## 2022-01-03 PROCEDURE — 99213 OFFICE O/P EST LOW 20 MIN: CPT | Performed by: PHYSICIAN ASSISTANT

## 2022-01-03 PROCEDURE — U0005 INFEC AGEN DETEC AMPLI PROBE: HCPCS | Performed by: PHYSICIAN ASSISTANT

## 2022-01-03 PROCEDURE — 1036F TOBACCO NON-USER: CPT | Performed by: PHYSICIAN ASSISTANT

## 2022-01-03 RX ORDER — PREDNISONE 20 MG/1
20 TABLET ORAL 2 TIMES DAILY WITH MEALS
Qty: 10 TABLET | Refills: 0 | Status: SHIPPED | OUTPATIENT
Start: 2022-01-03 | End: 2022-07-15 | Stop reason: ALTCHOICE

## 2022-01-03 NOTE — PROGRESS NOTES
COVID-19 Outpatient Progress Note    Assessment/Plan:    1  Sinus congestion    - negative at home x 2, will start prednisone 1 tab twice daily for 5 days, fluids, rest  Will do PCR here  Call if any concerns  - predniSONE 20 mg tablet; Take 1 tablet (20 mg total) by mouth 2 (two) times a day with meals  Dispense: 10 tablet; Refill: 0    2  Viral infection, unspecified    - COVID Only - Office Collect    F/u as needed     Disposition:     Recommended patient to come to the office to test for COVID-19  Risks and benefits of COVID-19 vaccination was discussed with patient  I have spent 15 minutes directly with the patient  Greater than 50% of this time was spent in counseling/coordination of care regarding: diagnostic results, prognosis, risks and benefits of treatment options, instructions for management, patient and family education, importance of treatment compliance, risk factor reductions and impressions  Encounter provider Pascual Weir PA-C    Provider located at [address]  [phone]    Recent Visits  No visits were found meeting these conditions  Showing recent visits within past 7 days and meeting all other requirements  Today's Visits  Date Type Provider Dept   01/03/22 Telemedicine Pascual Weir PA-C [address] today's visits and meeting all other requirements  Future Appointments  No visits were found meeting these conditions  Showing future appointments within next 150 days and meeting all other requirements     This virtual check-in was done via [employer] and patient was informed that this is a secure, HIPAA-compliant platform  He agrees to proceed  Patient agrees to participate in a virtual check in via telephone or video visit instead of presenting to the office to address urgent/immediate medical needs   Patient is aware this is a billable service  After connecting through [facility], the patient was identified by name and date of birth  Drew Mcneill was informed that this was a telemedicine visit and that the exam was being conducted confidentially over secure lines  My office door was closed  No one else was in the room  Drew Mcneill acknowledged consent and understanding of privacy and security of the telemedicine visit  I informed the patient that I have reviewed his record in Epic and presented the opportunity for him to ask any questions regarding the visit today  The patient agreed to participate  Verification of patient location:  Patient is located in the following state in which I hold an active license: [place]    Subjective:   Drew Mcneill is a 39 y o  male who is concerned about COVID-19  Patient's symptoms include fatigue, malaise, nasal congestion, rhinorrhea, cough, myalgias and headache  Patient denies fever, chills, sore throat, anosmia, loss of taste, shortness of breath, chest tightness, abdominal pain, nausea, vomiting and diarrhea       Date of symptom onset: 12/30/2021  COVID-19 vaccination status: Fully vaccinated (primary series)    Exposure:   Contact with a person who is under investigation (PUI) for or who is positive for COVID-19 within the last 14 days?: No    Hospitalized recently for fever and/or lower respiratory symptoms?: No      Currently a healthcare worker that is involved in direct patient care?: No      Works in a special setting where the risk of COVID-19 transmission may be high? (this may include long-term care, correctional and snf facilities; homeless shelters; assisted-living facilities and group homes ): No      Resident in a special setting where the risk of COVID-19 transmission may be high? (this may include long-term care, correctional and snf facilities; homeless shelters; assisted-living facilities and group homes ): No      Lab Results   Component Value Date    SARSCOV2 Negative 07/06/2021     Past Medical History:   Diagnosis Date    Allergic      Past Surgical History:   Procedure Laterality Date    KNEE SURGERY      right knee ACL reconstruction 1999     Current Outpatient Medications   Medication Sig Dispense Refill    predniSONE 20 mg tablet Take 1 tablet (20 mg total) by mouth 2 (two) times a day with meals 10 tablet 0     No current facility-administered medications for this visit  Allergies   Allergen Reactions    Shellfish Allergy - Food Allergy Anaphylaxis     Category: Allergy;     Amoxicillin Hives     Category: Allergy;     Pollen Extract Allergic Rhinitis       Review of Systems   Constitutional: Positive for fatigue  Negative for chills and fever  HENT: Positive for congestion and rhinorrhea  Negative for sore throat  Respiratory: Positive for cough  Negative for chest tightness and shortness of breath  Gastrointestinal: Negative for abdominal pain, diarrhea, nausea and vomiting  Musculoskeletal: Positive for myalgias  Neurological: Positive for headaches  Objective: There were no vitals filed for this visit  Physical Exam  Constitutional:       Appearance: Normal appearance  Pulmonary:      Effort: Pulmonary effort is normal  No respiratory distress  Neurological:      General: No focal deficit present  Mental Status: He is alert and oriented to person, place, and time  Psychiatric:         Mood and Affect: Mood normal          Behavior: Behavior normal          Thought Content: Thought content normal          Judgment: Judgment normal          VIRTUAL VISIT DISCLAIMER    Jeffbala Red verbally agrees to participate in [employer]   Pt is aware that Virtual Care Services could be limited without vital signs or the ability to perform a full hands-on physical exam  David Murillo understands he or the provider may request at any time to terminate the video visit and request the patient to seek care or treatment in person

## 2022-01-04 LAB — SARS-COV-2 RNA RESP QL NAA+PROBE: NEGATIVE

## 2022-07-15 ENCOUNTER — APPOINTMENT (OUTPATIENT)
Dept: RADIOLOGY | Facility: CLINIC | Age: 37
End: 2022-07-15
Payer: COMMERCIAL

## 2022-07-15 ENCOUNTER — OFFICE VISIT (OUTPATIENT)
Dept: FAMILY MEDICINE CLINIC | Facility: CLINIC | Age: 37
End: 2022-07-15
Payer: COMMERCIAL

## 2022-07-15 ENCOUNTER — APPOINTMENT (OUTPATIENT)
Dept: LAB | Facility: CLINIC | Age: 37
End: 2022-07-15
Payer: COMMERCIAL

## 2022-07-15 VITALS
WEIGHT: 315 LBS | BODY MASS INDEX: 42.66 KG/M2 | SYSTOLIC BLOOD PRESSURE: 130 MMHG | HEIGHT: 72 IN | HEART RATE: 70 BPM | RESPIRATION RATE: 16 BRPM | DIASTOLIC BLOOD PRESSURE: 90 MMHG

## 2022-07-15 DIAGNOSIS — R10.11 RUQ PAIN: ICD-10-CM

## 2022-07-15 DIAGNOSIS — F33.9 DEPRESSION, RECURRENT (HCC): ICD-10-CM

## 2022-07-15 DIAGNOSIS — E66.01 MORBID OBESITY WITH BMI OF 40.0-44.9, ADULT (HCC): ICD-10-CM

## 2022-07-15 DIAGNOSIS — M25.561 CHRONIC PAIN OF RIGHT KNEE: ICD-10-CM

## 2022-07-15 DIAGNOSIS — G89.29 CHRONIC PAIN OF RIGHT KNEE: Primary | ICD-10-CM

## 2022-07-15 DIAGNOSIS — M25.561 CHRONIC PAIN OF RIGHT KNEE: Primary | ICD-10-CM

## 2022-07-15 DIAGNOSIS — F32.1 CURRENT MODERATE EPISODE OF MAJOR DEPRESSIVE DISORDER WITHOUT PRIOR EPISODE (HCC): ICD-10-CM

## 2022-07-15 DIAGNOSIS — G89.29 CHRONIC PAIN OF RIGHT KNEE: ICD-10-CM

## 2022-07-15 LAB
ALBUMIN SERPL BCP-MCNC: 4.2 G/DL (ref 3.5–5)
ALP SERPL-CCNC: 51 U/L (ref 46–116)
ALT SERPL W P-5'-P-CCNC: 71 U/L (ref 12–78)
ANION GAP SERPL CALCULATED.3IONS-SCNC: 7 MMOL/L (ref 4–13)
AST SERPL W P-5'-P-CCNC: 35 U/L (ref 5–45)
BASOPHILS # BLD AUTO: 0.04 THOUSANDS/ΜL (ref 0–0.1)
BASOPHILS NFR BLD AUTO: 1 % (ref 0–1)
BILIRUB SERPL-MCNC: 0.68 MG/DL (ref 0.2–1)
BUN SERPL-MCNC: 26 MG/DL (ref 5–25)
CALCIUM SERPL-MCNC: 9.5 MG/DL (ref 8.3–10.1)
CHLORIDE SERPL-SCNC: 109 MMOL/L (ref 100–108)
CO2 SERPL-SCNC: 23 MMOL/L (ref 21–32)
CREAT SERPL-MCNC: 0.9 MG/DL (ref 0.6–1.3)
EOSINOPHIL # BLD AUTO: 0.1 THOUSAND/ΜL (ref 0–0.61)
EOSINOPHIL NFR BLD AUTO: 2 % (ref 0–6)
ERYTHROCYTE [DISTWIDTH] IN BLOOD BY AUTOMATED COUNT: 13.9 % (ref 11.6–15.1)
GFR SERPL CREATININE-BSD FRML MDRD: 108 ML/MIN/1.73SQ M
GLUCOSE P FAST SERPL-MCNC: 89 MG/DL (ref 65–99)
HCT VFR BLD AUTO: 47.4 % (ref 36.5–49.3)
HGB BLD-MCNC: 16.3 G/DL (ref 12–17)
IMM GRANULOCYTES # BLD AUTO: 0.04 THOUSAND/UL (ref 0–0.2)
IMM GRANULOCYTES NFR BLD AUTO: 1 % (ref 0–2)
LYMPHOCYTES # BLD AUTO: 1.74 THOUSANDS/ΜL (ref 0.6–4.47)
LYMPHOCYTES NFR BLD AUTO: 26 % (ref 14–44)
MCH RBC QN AUTO: 28.4 PG (ref 26.8–34.3)
MCHC RBC AUTO-ENTMCNC: 34.4 G/DL (ref 31.4–37.4)
MCV RBC AUTO: 83 FL (ref 82–98)
MONOCYTES # BLD AUTO: 0.62 THOUSAND/ΜL (ref 0.17–1.22)
MONOCYTES NFR BLD AUTO: 9 % (ref 4–12)
NEUTROPHILS # BLD AUTO: 4.08 THOUSANDS/ΜL (ref 1.85–7.62)
NEUTS SEG NFR BLD AUTO: 61 % (ref 43–75)
NRBC BLD AUTO-RTO: 0 /100 WBCS
PLATELET # BLD AUTO: 209 THOUSANDS/UL (ref 149–390)
PMV BLD AUTO: 10.8 FL (ref 8.9–12.7)
POTASSIUM SERPL-SCNC: 4.3 MMOL/L (ref 3.5–5.3)
PROT SERPL-MCNC: 7.6 G/DL (ref 6.4–8.2)
RBC # BLD AUTO: 5.74 MILLION/UL (ref 3.88–5.62)
SODIUM SERPL-SCNC: 139 MMOL/L (ref 136–145)
WBC # BLD AUTO: 6.62 THOUSAND/UL (ref 4.31–10.16)

## 2022-07-15 PROCEDURE — 36415 COLL VENOUS BLD VENIPUNCTURE: CPT

## 2022-07-15 PROCEDURE — 73564 X-RAY EXAM KNEE 4 OR MORE: CPT

## 2022-07-15 PROCEDURE — 80053 COMPREHEN METABOLIC PANEL: CPT

## 2022-07-15 PROCEDURE — 85025 COMPLETE CBC W/AUTO DIFF WBC: CPT

## 2022-07-15 PROCEDURE — 99214 OFFICE O/P EST MOD 30 MIN: CPT | Performed by: PHYSICIAN ASSISTANT

## 2022-07-15 NOTE — PROGRESS NOTES
Assessment/Plan:    1  Chronic pain of right knee    - surgery on right knee age 11, will start with XR and will most likely need PT then consider MRI, will need open MRI  - XR knee 4+ vw right injury; Future    2  RUQ pain    - low fat diet, hydration, will do labs and US, if pain gets worse should go to ER  - US right upper quadrant; Future  - CBC and differential; Future  - Comprehensive metabolic panel; Future    3  Morbid obesity with BMI of 40 0-44 9, adult ([facility] )    - needs to work on better diet, hydration  Has gained 100 lbs past few years  4  Depression, recurrent (HCC)/Current moderate episode of major depressive disorder without prior episode ([facility] )    - stable without medications    F/u as needed    Subjective:   Chief Complaint   Patient presents with    Chest Pain     Times 2-3 weeks     Back Pain    Knee Pain     L      Patient ID: Jie Cartagena is a 39 y o  male  Patient with right lower chest wall pain for 2-3 weeks, getting progressively worse, sometimes feels like someone punching pain, wakes patient up at night, radiating around to back and up shoulder blade  More heart burn also  Pain rates about a 3:10, can make it hard to breathe  No trauma  Tried aleve with some relief  Worse with sleeping on couch  Patient with reconstructive surgery on right knee age 11, ACL reconstructed  Now with pain again, favoring and now has pain in both knees, right knee wthi swell, left knee making knees   Knees feel unstable      The following portions of the patient's history were reviewed and updated as appropriate: allergies, current medications, past family history, past medical history, past social history, past surgical history and problem list     Past Medical History:   Diagnosis Date    Allergic      Past Surgical History:   Procedure Laterality Date    KNEE SURGERY      right knee ACL reconstruction 1999     Family History   Problem Relation Age of Onset    Appendicitis Mother    Pam Wilson Other Mother         Kidney transplanted    Lupus Mother     Breast cancer Mother     Raynaud syndrome Mother     Kidney failure Mother     No Known Problems Father     Diabetes Other     Alcohol abuse Neg Hx     Substance Abuse Neg Hx     Mental illness Neg Hx      Social History     Socioeconomic History    Marital status: /Civil Union     Spouse name: Not on file    Number of children: Not on file    Years of education: Not on file    Highest education level: Not on file   Occupational History    Not on file   Tobacco Use    Smoking status: Former Smoker    Smokeless tobacco: Never Used   Vaping Use    Vaping Use: Never used   Substance and Sexual Activity    Alcohol use: Yes     Comment: drinks beer 1-2 times a week     Drug use: No    Sexual activity: Not on file   Other Topics Concern    Not on file   Social History Narrative    Always uses seat belt    Daily caffeine consumption 1 serving a day    Has smoke detectors     Social Determinants of Health     Financial Resource Strain: Not on file   Food Insecurity: Not on file   Transportation Needs: Not on file   Physical Activity: Not on file   Stress: Not on file   Social Connections: Not on file   Intimate Partner Violence: Not on file   Housing Stability: Not on file     No current outpatient medications on file  Review of Systems          Objective:    Vitals:    07/15/22 1208   BP: 130/90   Pulse: 70   Resp: 16   Weight: (!) 159 kg (351 lb 3 2 oz)   Height: 5' 11 75" (1 822 m)        Physical Exam  Constitutional:       Appearance: He is well-developed  He is obese  HENT:      Head: Normocephalic and atraumatic  Cardiovascular:      Rate and Rhythm: Normal rate and regular rhythm  Heart sounds: Normal heart sounds  Pulmonary:      Effort: Pulmonary effort is normal    Abdominal:      General: Abdomen is protuberant  Bowel sounds are normal       Palpations: Abdomen is soft  Tenderness:  There is abdominal tenderness in the right upper quadrant  There is guarding  Musculoskeletal:         General: Normal range of motion  Cervical back: Neck supple  Right lower leg: No tenderness  No edema  Left lower leg: No tenderness  No edema  Skin:     General: Skin is warm  Neurological:      General: No focal deficit present  Mental Status: He is alert and oriented to person, place, and time  BMI Counseling: Body mass index is 47 96 kg/m²  The BMI is above normal  Nutrition recommendations include reducing portion sizes

## 2022-07-22 ENCOUNTER — HOSPITAL ENCOUNTER (OUTPATIENT)
Dept: ULTRASOUND IMAGING | Facility: HOSPITAL | Age: 37
Discharge: HOME | End: 2022-07-22
Payer: COMMERCIAL

## 2022-07-22 DIAGNOSIS — R10.11 RUQ PAIN: ICD-10-CM

## 2022-07-22 PROCEDURE — 76705 ECHO EXAM OF ABDOMEN: CPT

## 2022-07-25 ENCOUNTER — OFFICE VISIT (OUTPATIENT)
Dept: FAMILY MEDICINE CLINIC | Facility: CLINIC | Age: 37
End: 2022-07-25
Payer: COMMERCIAL

## 2022-07-25 VITALS
SYSTOLIC BLOOD PRESSURE: 136 MMHG | WEIGHT: 315 LBS | HEART RATE: 74 BPM | DIASTOLIC BLOOD PRESSURE: 88 MMHG | HEIGHT: 72 IN | BODY MASS INDEX: 42.66 KG/M2 | RESPIRATION RATE: 16 BRPM

## 2022-07-25 DIAGNOSIS — R10.11 RUQ PAIN: ICD-10-CM

## 2022-07-25 DIAGNOSIS — M25.561 CHRONIC PAIN OF RIGHT KNEE: Primary | ICD-10-CM

## 2022-07-25 DIAGNOSIS — G89.29 CHRONIC PAIN OF RIGHT KNEE: Primary | ICD-10-CM

## 2022-07-25 PROCEDURE — 99214 OFFICE O/P EST MOD 30 MIN: CPT | Performed by: PHYSICIAN ASSISTANT

## 2022-07-25 PROCEDURE — 3725F SCREEN DEPRESSION PERFORMED: CPT | Performed by: PHYSICIAN ASSISTANT

## 2022-07-25 RX ORDER — MELOXICAM 15 MG/1
15 TABLET ORAL DAILY
Qty: 30 TABLET | Refills: 5 | Status: SHIPPED | OUTPATIENT
Start: 2022-07-25 | End: 2022-09-07 | Stop reason: ALTCHOICE

## 2022-07-25 NOTE — PROGRESS NOTES
Assessment/Plan:    1  Right knee pain - chronic pain, unstable, XR reviewed today and showed mild OA,  will try mobic 15 mg as needed can try tylenol arthritis also, discussed PT but has done this in the past without success, had surgery on right knee as a teenager, will order Mri and consider ortho    2  RUQ - has improved with diet, low fat, will await US results  Labs reviewed and normal    F/u as needed    Subjective:   Chief Complaint   Patient presents with   [address] results      Patient ID: Bradley Lucas is a 39 y o  male  Patient here in follow up to review XR, US and labs  Patient XR showed mild OA, still with pain, feeling unstable at times  Swelling off and on  Surgery in his teens, see prior note  RUQ has gotten better now that patient is on a low fat diet provided by his wife  Had [address] done last Friday         The following portions of the patient's history were reviewed and updated as appropriate: allergies, current medications, past family history, past medical history, past social history, past surgical history and problem list     Past Medical History:   Diagnosis Date    Allergic      Past Surgical History:   Procedure Laterality Date    KNEE SURGERY      right knee ACL reconstruction 1999     Family History   Problem Relation Age of Onset    Appendicitis Mother     Other Mother         Kidney transplanted    Lupus Mother     Breast cancer Mother     Raynaud syndrome Mother     Kidney failure Mother     No Known Problems Father     Diabetes Other     Alcohol abuse Neg Hx     Substance Abuse Neg Hx     Mental illness Neg Hx      Social History     Socioeconomic History    Marital status: /Civil Union     Spouse name: Not on file    Number of children: Not on file    Years of education: Not on file    Highest education level: Not on file   Occupational History    Not on file   Tobacco Use    Smoking status: Former Smoker    Smokeless tobacco: Never Used Vaping Use    Vaping Use: Never used   Substance and Sexual Activity    Alcohol use: Yes     Comment: drinks beer 1-2 times a week     Drug use: No    Sexual activity: Not on file   Other Topics Concern    Not on file   Social History Narrative    Always uses seat belt    Daily caffeine consumption 1 serving a day    Has smoke detectors     Social Determinants of Health     Financial Resource Strain: Not on file   Food Insecurity: Not on file   Transportation Needs: Not on file   Physical Activity: Not on file   Stress: Not on file   Social Connections: Not on file   Intimate Partner Violence: Not on file   Housing Stability: Not on file     No current outpatient medications on file  Review of Systems          Objective:    Vitals:    07/25/22 0928   BP: 136/88   Pulse: 74   Resp: 16   Weight: (!) 159 kg (349 lb 12 8 oz)   Height: 5' 11 75" (1 822 m)     Wt Readings from Last 3 Encounters:   07/25/22 (!) 159 kg (349 lb 12 8 oz)   07/15/22 (!) 159 kg (351 lb 3 2 oz)   07/06/21 136 kg (300 lb)        Physical Exam  Constitutional:       Appearance: Normal appearance  HENT:      Head: Normocephalic and atraumatic  Musculoskeletal:         General: No swelling or deformity  Normal range of motion  Neurological:      General: No focal deficit present  Mental Status: He is alert and oriented to person, place, and time  Psychiatric:         Mood and Affect: Mood normal          Behavior: Behavior normal          Thought Content:  Thought content normal          Judgment: Judgment normal

## 2022-09-07 ENCOUNTER — OFFICE VISIT (OUTPATIENT)
Dept: FAMILY MEDICINE CLINIC | Facility: CLINIC | Age: 37
End: 2022-09-07
Payer: COMMERCIAL

## 2022-09-07 VITALS
HEART RATE: 83 BPM | WEIGHT: 315 LBS | SYSTOLIC BLOOD PRESSURE: 138 MMHG | TEMPERATURE: 97.7 F | HEIGHT: 72 IN | DIASTOLIC BLOOD PRESSURE: 84 MMHG | RESPIRATION RATE: 16 BRPM | BODY MASS INDEX: 42.66 KG/M2

## 2022-09-07 DIAGNOSIS — J01.00 ACUTE NON-RECURRENT MAXILLARY SINUSITIS: Primary | ICD-10-CM

## 2022-09-07 PROCEDURE — 99213 OFFICE O/P EST LOW 20 MIN: CPT | Performed by: PHYSICIAN ASSISTANT

## 2022-09-07 RX ORDER — AZITHROMYCIN 250 MG/1
TABLET, FILM COATED ORAL
Qty: 6 TABLET | Refills: 0 | Status: SHIPPED | OUTPATIENT
Start: 2022-09-07 | End: 2022-09-12

## 2022-09-07 NOTE — LETTER
September 7, 2022     Patient: Aileen Travis  YOB: 1985  Date of Visit: 9/7/2022      To Whom it May Concern:    Lottie Hickey is under my professional care  Jackeline Case was seen in my office on 9/7/2022  Jackeline Case may return to work on 9/9/2022  If you have any questions or concerns, please don't hesitate to call           Sincerely,          Jarrett Linn PA-C        CC: No Recipients

## 2022-09-07 NOTE — PROGRESS NOTES
Assessment/Plan:    1  Acute non-recurrent maxillary sinusitis    - start zpack, mucinex, fluids, rest  - azithromycin (Zithromax) 250 mg tablet; Take 2 tablets (500 mg total) by mouth daily for 1 day, THEN 1 tablet (250 mg total) daily for 4 days  Dispense: 6 tablet; Refill: 0    F/u as needed    Subjective:   Chief Complaint   Patient presents with    Nasal Congestion    Fatigue     Times 3 days      Patient ID: Gloria Grider is a 39 y o  male  Last week daughter had a cold, dad got cold  Then he began with symptoms, by Saturday patient began feeling sick  Sinus congestion, post nasal drip, coughing  Green mucus  Bloody noses  Chest heavy, coughing up green mucus now also  Tried to work yesterday and was sent home  Has taken covid tests and all negative         The following portions of the patient's history were reviewed and updated as appropriate: allergies, current medications, past family history, past medical history, past social history, past surgical history and problem list     Past Medical History:   Diagnosis Date    Allergic      Past Surgical History:   Procedure Laterality Date    KNEE SURGERY      right knee ACL reconstruction 1999     Family History   Problem Relation Age of Onset    Appendicitis Mother     Other Mother         Kidney transplanted    Lupus Mother     Breast cancer Mother     Raynaud syndrome Mother     Kidney failure Mother     No Known Problems Father     Diabetes Other     Alcohol abuse Neg Hx     Substance Abuse Neg Hx     Mental illness Neg Hx      Social History     Socioeconomic History    Marital status: /Civil Union     Spouse name: Not on file    Number of children: Not on file    Years of education: Not on file    Highest education level: Not on file   Occupational History    Not on file   Tobacco Use    Smoking status: Former Smoker    Smokeless tobacco: Never Used   Vaping Use    Vaping Use: Never used   Substance and Sexual Activity    Alcohol use: Yes     Comment: drinks beer 1-2 times a week     Drug use: No    Sexual activity: Not on file   Other Topics Concern    Not on file   Social History Narrative    Always uses seat belt    Daily caffeine consumption 1 serving a day    Has smoke detectors     Social Determinants of Health     Financial Resource Strain: Not on file   Food Insecurity: Not on file   Transportation Needs: Not on file   Physical Activity: Not on file   Stress: Not on file   Social Connections: Not on file   Intimate Partner Violence: Not on file   Housing Stability: Not on file     No current outpatient medications on file  Review of Systems          Objective:    Vitals:    09/07/22 1217   BP: 138/84   Pulse: 83   Resp: 16   Temp: 97 7 °F (36 5 °C)   Weight: (!) 158 kg (348 lb 14 4 oz)   Height: 5' 11 75" (1 822 m)        Physical Exam      Constitutional: Patient is oriented to person, place, and time  appears well-developed and well-nourished  HENT:   Head: Normocephalic and atraumatic  Right Ear: Tympanic membrane, external ear and ear canal normal    Left Ear: Tympanic membrane, external ear and ear canal normal    Nose: Mucosal edema present  Mouth/Throat: Posterior oropharyngeal erythema present  Turbinates inflamed with purulent mucus, pharynx with purulent post nasal drip and erythema   Eyes: Conjunctivae are normal    Neck: Neck supple  Cardiovascular: Normal rate, regular rhythm and normal heart sounds  Pulmonary/Chest: Effort normal and breath sounds normal    Lymphadenopathy: no cervical adenopathy  Neurological: Patient is alert and oriented to person, place, and time  Skin: Skin is warm  Psychiatric: Patient has a normal mood and affect

## 2023-01-03 NOTE — PATIENT INSTRUCTIONS
Called patient and left message regarding lab results (Perm on file). Informed patient to call office with any questions.   Flonase daily for sinus congestion  Increase fluid intake  Robotussin, sudafed, humidified air for symptomatic relief  Watch for fevers  Follow up with your PCP for persistent symptoms  Go to the ER for any distress

## 2023-01-12 ENCOUNTER — OFFICE VISIT (OUTPATIENT)
Dept: URGENT CARE | Facility: CLINIC | Age: 38
End: 2023-01-12

## 2023-01-12 VITALS
HEIGHT: 72 IN | HEART RATE: 73 BPM | SYSTOLIC BLOOD PRESSURE: 136 MMHG | WEIGHT: 315 LBS | BODY MASS INDEX: 42.66 KG/M2 | RESPIRATION RATE: 16 BRPM | OXYGEN SATURATION: 98 % | TEMPERATURE: 97.1 F | DIASTOLIC BLOOD PRESSURE: 80 MMHG

## 2023-01-12 DIAGNOSIS — J01.00 ACUTE NON-RECURRENT MAXILLARY SINUSITIS: Primary | ICD-10-CM

## 2023-01-12 RX ORDER — DOXYCYCLINE 100 MG/1
100 TABLET ORAL 2 TIMES DAILY
Qty: 14 TABLET | Refills: 0 | Status: SHIPPED | OUTPATIENT
Start: 2023-01-12 | End: 2023-01-19

## 2023-01-12 NOTE — PROGRESS NOTES
[facility]        NAME: Bobbi Meadows is a 44 y o  male  : 1985    MRN: [id number]  DATE: 2023  TIME: 10:11 AM    Assessment and Plan   Acute non-recurrent maxillary sinusitis [J01 00]  1  Acute non-recurrent maxillary sinusitis  doxycycline (ADOXA) 100 MG tablet            Patient Instructions     Start treatment as directed  Follow up with PCP in 2-3 days  Proceed to ER if symptoms worsen  Chief Complaint     Chief Complaint   Patient presents with   • Cold Like Symptoms     Pt reports bilateral ear fullness for one weeks with sinus congestion, chest congestion and throat irritation  States progression of symptoms two days ago  Negative at home Covid test yesterday  Managing symptoms at home with Mucinex with some symptom relief  History of Present Illness     44 y o  M  Sinus pressure, congestion, PND, bilateral ear pressure x 1 week  States symptoms improved and then worsened again  Mucinex OTC  Home COVID testing negative  Denies fevers  No CP or SOB    Review of Systems   Review of Systems   Constitutional: Negative for chills, fatigue and fever  HENT: Positive for congestion, ear pain, postnasal drip and sinus pressure  Negative for ear discharge, facial swelling, rhinorrhea, sinus pain, sore throat and trouble swallowing  Eyes: Negative for photophobia, pain and visual disturbance  Respiratory: Negative for cough, chest tightness, shortness of breath and wheezing  Cardiovascular: Negative for chest pain, palpitations and leg swelling  Gastrointestinal: Negative for abdominal pain, diarrhea, nausea and vomiting  Genitourinary: Negative for dysuria, flank pain and hematuria  Musculoskeletal: Negative for arthralgias, back pain, myalgias and neck pain  Skin: Negative for pallor and wound  Neurological: Negative for dizziness, syncope, weakness, numbness and headaches  Psychiatric/Behavioral: Negative for confusion and sleep disturbance  All other systems reviewed and are negative  Current Medications       Current Outpatient Medications:   •  doxycycline (ADOXA) 100 MG tablet, Take 1 tablet (100 mg total) by mouth 2 (two) times a day for 7 days, Disp: 14 tablet, Rfl: 0    Current Allergies     Allergies as of 01/12/2023 - Reviewed 01/12/2023   Allergen Reaction Noted   • Shellfish allergy - food allergy Anaphylaxis 05/24/2017   • Amoxicillin Hives 05/24/2017   • Pollen extract Allergic Rhinitis 05/24/2017            The following portions of the patient's history were reviewed and updated as appropriate: allergies, current medications, past family history, past medical history, past social history, past surgical history and problem list      Past Medical History:   Diagnosis Date   • Allergic        Past Surgical History:   Procedure Laterality Date   • KNEE SURGERY      right knee ACL reconstruction 1999       Family History   Problem Relation Age of Onset   • Appendicitis Mother    • Other Mother         Kidney transplanted   • Lupus Mother    • Breast cancer Mother    • Raynaud syndrome Mother    • Kidney failure Mother    • No Known Problems Father    • Diabetes Other    • Alcohol abuse Neg Hx    • Substance Abuse Neg Hx    • Mental illness Neg Hx          Medications have been verified  Objective   /80 (BP Location: Left arm, Patient Position: Sitting, Cuff Size: Large)   Pulse 73   Temp (!) 97 1 °F (36 2 °C)   Resp 16   Ht 6' (1 829 m)   Wt (!) 154 kg (340 lb)   SpO2 98%   BMI 46 11 kg/m²   No LMP for male patient  Physical Exam     Physical Exam  Vitals reviewed  Constitutional:       General: He is not in acute distress  Appearance: He is obese  He is not ill-appearing or toxic-appearing  HENT:      Head: Normocephalic  Right Ear: A middle ear effusion is present  Tympanic membrane is not erythematous or bulging  Left Ear: A middle ear effusion is present   Tympanic membrane is not erythematous or bulging  Nose: Congestion present  Right Sinus: Maxillary sinus tenderness present  No frontal sinus tenderness  Left Sinus: Maxillary sinus tenderness present  No frontal sinus tenderness  Mouth/Throat:      Mouth: Mucous membranes are moist       Pharynx: Oropharynx is clear  Uvula midline  Posterior oropharyngeal erythema (PND) present  No oropharyngeal exudate or uvula swelling  Tonsils: No tonsillar exudate or tonsillar abscesses  Eyes:      Extraocular Movements: Extraocular movements intact  Conjunctiva/sclera: Conjunctivae normal       Pupils: Pupils are equal, round, and reactive to light  Cardiovascular:      Rate and Rhythm: Normal rate and regular rhythm  Pulses: Normal pulses  Heart sounds: Normal heart sounds  Pulmonary:      Effort: Pulmonary effort is normal  No tachypnea or respiratory distress  Breath sounds: Normal breath sounds and air entry  No decreased breath sounds, wheezing, rhonchi or rales  Abdominal:      General: Bowel sounds are normal       Palpations: Abdomen is soft  Tenderness: There is no abdominal tenderness  Musculoskeletal:         General: Normal range of motion  Cervical back: Normal range of motion and neck supple  Lymphadenopathy:      Cervical: No cervical adenopathy  Skin:     General: Skin is warm and dry  Capillary Refill: Capillary refill takes less than 2 seconds  Neurological:      General: No focal deficit present  Mental Status: He is alert  Cranial Nerves: No cranial nerve deficit  Sensory: Sensation is intact  Motor: Motor function is intact  Coordination: Coordination is intact  Deep Tendon Reflexes: Reflexes are normal and symmetric

## 2023-01-23 ENCOUNTER — OFFICE VISIT (OUTPATIENT)
Dept: FAMILY MEDICINE CLINIC | Facility: CLINIC | Age: 38
End: 2023-01-23

## 2023-01-23 VITALS
DIASTOLIC BLOOD PRESSURE: 98 MMHG | SYSTOLIC BLOOD PRESSURE: 134 MMHG | RESPIRATION RATE: 14 BRPM | WEIGHT: 315 LBS | HEART RATE: 88 BPM | OXYGEN SATURATION: 97 % | BODY MASS INDEX: 42.66 KG/M2 | HEIGHT: 72 IN | TEMPERATURE: 99.1 F

## 2023-01-23 DIAGNOSIS — B34.9 VIRAL ILLNESS: ICD-10-CM

## 2023-01-23 DIAGNOSIS — Z20.822 SUSPECTED COVID-19 VIRUS INFECTION: Primary | ICD-10-CM

## 2023-01-23 NOTE — PROGRESS NOTES
Assessment/Plan:    1  Viral illness - will test for flu, symptomatic relief until results are back  Will call tomorrow  If s/s change call    F/u as needed        Subjective:   Chief Complaint   Patient presents with   • Respiratory Distress     Started three weeks ago   • heavy chest   • Fatigue     Neg COVID test   • Fever     Last week had a fever, on and off,   Body chills  Lasted for two days    • Shortness of Breath   • Sore Throat     Over a week ago, stopped since    • Cough     Dry cough, and wet cough  A lot of mucus at night    • Earache     Ears are clogged      Patient ID: Salena Negron is a 44 y o  male  Patients daughter started with a cold, gave it to patient  Patient waited it out for a week  Then 2 days later began with 1/12/2023 Sinus pressure, congestion, PND, bilateral ear pressure, tried mucinex felt better then worse  Went to Urgent Care and was put on doxy bid x 7 days, felt no better  Now with wife and patient with fever  Wife fever for 2-3 days  Patient woke up this morning, goopy eye, feels like he has a cinder block on chest  Went to work was told to leave  Fever now, achy today  More fatigued  Shortness of Breath  The current episode started 1 to 4 weeks ago  The problem occurs daily  The problem has been waxing and waning since onset  Associated symptoms include wheezing  The symptoms are aggravated by nothing and URIs         The following portions of the patient's history were reviewed and updated as appropriate: allergies, current medications, past family history, past medical history, past social history, past surgical history and problem list     Past Medical History:   Diagnosis Date   • Allergic      Past Surgical History:   Procedure Laterality Date   • KNEE SURGERY      right knee ACL reconstruction 1999     Family History   Problem Relation Age of Onset   • Appendicitis Mother    • Other Mother         Kidney transplanted   • Lupus Mother    • Breast cancer Mother    • Raynaud syndrome Mother    • Kidney failure Mother    • No Known Problems Father    • Diabetes Other    • Alcohol abuse Neg Hx    • Substance Abuse Neg Hx    • Mental illness Neg Hx      Social History     Socioeconomic History   • Marital status: /Civil Union     Spouse name: Not on file   • Number of children: Not on file   • Years of education: Not on file   • Highest education level: Not on file   Occupational History   • Not on file   Tobacco Use   • Smoking status: Former   • Smokeless tobacco: Never   Vaping Use   • Vaping Use: Never used   Substance and Sexual Activity   • Alcohol use: Yes     Comment: drinks beer 1-2 times a week    • Drug use: No   • Sexual activity: Not on file   Other Topics Concern   • Not on file   Social History Narrative    Always uses seat belt    Daily caffeine consumption 1 serving a day    Has smoke detectors     Social Determinants of Health     Financial Resource Strain: Not on file   Food Insecurity: Not on file   Transportation Needs: Not on file   Physical Activity: Not on file   Stress: Not on file   Social Connections: Not on file   Intimate Partner Violence: Not on file   Housing Stability: Not on file     No current outpatient medications on file  Review of Systems   Respiratory: Positive for shortness of breath and wheezing  Objective:    Vitals:    01/23/23 1421   BP: 134/98   BP Location: Left arm   Patient Position: Sitting   Cuff Size: Large   Pulse: 88   Resp: 14   Temp: 99 1 °F (37 3 °C)   SpO2: 97%   Weight: (!) 162 kg (357 lb 6 4 oz)   Height: 6' (1 829 m)        Physical Exam  Constitutional:       General: He is not in acute distress  Appearance: He is well-developed  He is ill-appearing  He is not toxic-appearing  HENT:      Head: Normocephalic and atraumatic        Right Ear: Tympanic membrane, ear canal and external ear normal       Left Ear: Tympanic membrane, ear canal and external ear normal       Nose: Mucosal edema and rhinorrhea present  Mouth/Throat:      Mouth: Mucous membranes are moist       Pharynx: Oropharynx is clear  No oropharyngeal exudate or posterior oropharyngeal erythema  Eyes:      Extraocular Movements: Extraocular movements intact  Cardiovascular:      Rate and Rhythm: Normal rate and regular rhythm  Pulses: Normal pulses  Heart sounds: Normal heart sounds  Pulmonary:      Effort: Pulmonary effort is normal       Breath sounds: Normal breath sounds  Musculoskeletal:      Cervical back: Normal range of motion and neck supple  Lymphadenopathy:      Cervical: No cervical adenopathy  Skin:     General: Skin is warm  Neurological:      General: No focal deficit present  Mental Status: He is alert and oriented to person, place, and time     Psychiatric:         Mood and Affect: Mood normal          Behavior: Behavior normal          Judgment: Judgment normal

## 2023-01-24 DIAGNOSIS — J01.00 ACUTE NON-RECURRENT MAXILLARY SINUSITIS: Primary | ICD-10-CM

## 2023-01-24 LAB
FLUAV RNA RESP QL NAA+PROBE: NEGATIVE
FLUBV RNA RESP QL NAA+PROBE: NEGATIVE
SARS-COV-2 RNA RESP QL NAA+PROBE: NEGATIVE

## 2023-01-24 RX ORDER — CEFUROXIME AXETIL 500 MG/1
500 TABLET ORAL EVERY 12 HOURS SCHEDULED
Qty: 20 TABLET | Refills: 0 | Status: SHIPPED | OUTPATIENT
Start: 2023-01-24 | End: 2023-02-03

## 2023-01-24 RX ORDER — PREDNISONE 10 MG/1
TABLET ORAL
Qty: 20 TABLET | Refills: 0 | Status: SHIPPED | OUTPATIENT
Start: 2023-01-24 | End: 2023-03-10

## 2023-03-10 ENCOUNTER — OFFICE VISIT (OUTPATIENT)
Dept: FAMILY MEDICINE CLINIC | Facility: CLINIC | Age: 38
End: 2023-03-10

## 2023-03-10 VITALS
HEIGHT: 72 IN | SYSTOLIC BLOOD PRESSURE: 122 MMHG | DIASTOLIC BLOOD PRESSURE: 72 MMHG | HEART RATE: 79 BPM | BODY MASS INDEX: 42.66 KG/M2 | OXYGEN SATURATION: 97 % | RESPIRATION RATE: 18 BRPM | WEIGHT: 315 LBS

## 2023-03-10 DIAGNOSIS — E66.01 MORBID OBESITY WITH BMI OF 40.0-44.9, ADULT (HCC): ICD-10-CM

## 2023-03-10 DIAGNOSIS — R42 DIZZY: ICD-10-CM

## 2023-03-10 DIAGNOSIS — R06.02 SHORTNESS OF BREATH ON EXERTION: ICD-10-CM

## 2023-03-10 DIAGNOSIS — J01.00 ACUTE NON-RECURRENT MAXILLARY SINUSITIS: Primary | ICD-10-CM

## 2023-03-10 DIAGNOSIS — R07.9 CHEST PAIN, UNSPECIFIED TYPE: ICD-10-CM

## 2023-03-10 RX ORDER — CEFUROXIME AXETIL 500 MG/1
500 TABLET ORAL EVERY 12 HOURS SCHEDULED
Qty: 20 TABLET | Refills: 0 | Status: SHIPPED | OUTPATIENT
Start: 2023-03-10 | End: 2023-03-20

## 2023-03-10 RX ORDER — FLUTICASONE PROPIONATE 50 MCG
1 SPRAY, SUSPENSION (ML) NASAL DAILY
Qty: 18.2 ML | Refills: 1 | Status: SHIPPED | OUTPATIENT
Start: 2023-03-10

## 2023-03-10 NOTE — PROGRESS NOTES
Assessment/Plan:    1  Sinusitis - will treat with ceftin bid x 10 days, will use flonase for one month continously to try to prevent return of sinusitis    2  Chest pain on exertion - EKG with nonspecific changes, CXR, stress test and labs  If symptoms persist should GO TO ER    3  Sob on exertion - as above    4  Morbid obesity - stressed weight reduction    F/u as needed  F/u after testing is done    Subjective:   Chief Complaint   Patient presents with   • Nasal Congestion   • Cough   • Headache   • Dizziness   • Shortness of Breath      Patient ID: Yolis Garcia is a 44 y o  male  Patient with nasal congestion, ears congestion, sore throat, coughing productive green sputum, denies fever, chills  Really tired  Wife was sick first    Lucilla Gain like he keeps getting sinus infections, has been on antibiotics in July and September 2022, 1/12/23, 1/24/23  Also notes he has been getting sob and dizzy with exertion  Breathing heavier with moving  Today after getting kids up was dizzy and had to sit down  Heaviest weight has been ever been, previous max was 305 now 350 lb  Prior to covid 230 lb        Maternal grandfather - stroke, CAD  Maternal grandmother - diabetic      The following portions of the patient's history were reviewed and updated as appropriate: allergies, current medications, past family history, past medical history, past social history, past surgical history and problem list     Past Medical History:   Diagnosis Date   • Allergic      Past Surgical History:   Procedure Laterality Date   • KNEE SURGERY      right knee ACL reconstruction 1999     Family History   Problem Relation Age of Onset   • Appendicitis Mother    • Other Mother         Kidney transplanted   • Lupus Mother    • Breast cancer Mother    • Raynaud syndrome Mother    • Kidney failure Mother    • No Known Problems Father    • Diabetes Other    • Alcohol abuse Neg Hx    • Substance Abuse Neg Hx    • Mental illness Neg Hx Social History     Socioeconomic History   • Marital status: /Civil Union     Spouse name: Not on file   • Number of children: Not on file   • Years of education: Not on file   • Highest education level: Not on file   Occupational History   • Not on file   Tobacco Use   • Smoking status: Former   • Smokeless tobacco: Never   Vaping Use   • Vaping Use: Never used   Substance and Sexual Activity   • Alcohol use: Yes     Comment: drinks beer 1-2 times a week    • Drug use: No   • Sexual activity: Not on file   Other Topics Concern   • Not on file   Social History Narrative    Always uses seat belt    Daily caffeine consumption 1 serving a day    Has smoke detectors     Social Determinants of Health     Financial Resource Strain: Not on file   Food Insecurity: Not on file   Transportation Needs: Not on file   Physical Activity: Not on file   Stress: Not on file   Social Connections: Not on file   Intimate Partner Violence: Not on file   Housing Stability: Not on file     No current outpatient medications on file  Review of Systems          Objective:    Vitals:    03/10/23 1211   BP: 122/72   Pulse: 79   Resp: 18   SpO2: 97%   Weight: (!) 163 kg (359 lb 3 2 oz)   Height: 6' (1 829 m)        Physical Exam      Physical Exam   Constitutional: Patient is oriented to person, place, and time  appears well-developed and well-nourished  HENT:   Head: Normocephalic and atraumatic  Right Ear: Tympanic membrane, external ear and ear canal normal    Left Ear: Tympanic membrane, external ear and ear canal normal    Nose: Mucosal edema present  Mouth/Throat: Posterior oropharyngeal erythema present  Turbinates inflamed with purulent mucus, pharynx with purulent post nasal drip and erythema   Eyes: Conjunctivae are normal    Neck: Neck supple  Cardiovascular: Normal rate, regular rhythm and normal heart sounds      Pulmonary/Chest: Effort normal and breath sounds normal    Lymphadenopathy: no cervical adenopathy  Neurological: Patient is alert and oriented to person, place, and time  Skin: Skin is warm  Psychiatric: Patient has a normal mood and affect

## 2023-03-15 ENCOUNTER — APPOINTMENT (OUTPATIENT)
Dept: RADIOLOGY | Facility: CLINIC | Age: 38
End: 2023-03-15

## 2023-03-15 ENCOUNTER — APPOINTMENT (OUTPATIENT)
Dept: LAB | Facility: CLINIC | Age: 38
End: 2023-03-15

## 2023-03-15 DIAGNOSIS — R06.02 SHORTNESS OF BREATH ON EXERTION: ICD-10-CM

## 2023-03-15 DIAGNOSIS — R07.9 CHEST PAIN, UNSPECIFIED TYPE: ICD-10-CM

## 2023-03-15 DIAGNOSIS — R42 DIZZY: ICD-10-CM

## 2023-03-15 LAB
ALBUMIN SERPL BCP-MCNC: 3.8 G/DL (ref 3.5–5)
ALP SERPL-CCNC: 48 U/L (ref 46–116)
ALT SERPL W P-5'-P-CCNC: 59 U/L (ref 12–78)
ANION GAP SERPL CALCULATED.3IONS-SCNC: 5 MMOL/L (ref 4–13)
AST SERPL W P-5'-P-CCNC: 33 U/L (ref 5–45)
BACTERIA UR QL AUTO: ABNORMAL /HPF
BASOPHILS # BLD AUTO: 0.05 THOUSANDS/ÂΜL (ref 0–0.1)
BASOPHILS NFR BLD AUTO: 1 % (ref 0–1)
BILIRUB SERPL-MCNC: 0.4 MG/DL (ref 0.2–1)
BILIRUB UR QL STRIP: NEGATIVE
BUN SERPL-MCNC: 20 MG/DL (ref 5–25)
CALCIUM SERPL-MCNC: 9.3 MG/DL (ref 8.3–10.1)
CHLORIDE SERPL-SCNC: 109 MMOL/L (ref 96–108)
CHOLEST SERPL-MCNC: 208 MG/DL
CLARITY UR: CLEAR
CO2 SERPL-SCNC: 24 MMOL/L (ref 21–32)
COLOR UR: YELLOW
CREAT SERPL-MCNC: 0.89 MG/DL (ref 0.6–1.3)
EOSINOPHIL # BLD AUTO: 0.12 THOUSAND/ÂΜL (ref 0–0.61)
EOSINOPHIL NFR BLD AUTO: 2 % (ref 0–6)
ERYTHROCYTE [DISTWIDTH] IN BLOOD BY AUTOMATED COUNT: 14.2 % (ref 11.6–15.1)
GFR SERPL CREATININE-BSD FRML MDRD: 108 ML/MIN/1.73SQ M
GLUCOSE P FAST SERPL-MCNC: 120 MG/DL (ref 65–99)
GLUCOSE UR STRIP-MCNC: NEGATIVE MG/DL
HCT VFR BLD AUTO: 49.9 % (ref 36.5–49.3)
HDLC SERPL-MCNC: 45 MG/DL
HGB BLD-MCNC: 16.6 G/DL (ref 12–17)
HGB UR QL STRIP.AUTO: NEGATIVE
IMM GRANULOCYTES # BLD AUTO: 0.02 THOUSAND/UL (ref 0–0.2)
IMM GRANULOCYTES NFR BLD AUTO: 0 % (ref 0–2)
KETONES UR STRIP-MCNC: NEGATIVE MG/DL
LDLC SERPL CALC-MCNC: 109 MG/DL (ref 0–100)
LEUKOCYTE ESTERASE UR QL STRIP: NEGATIVE
LYMPHOCYTES # BLD AUTO: 1.68 THOUSANDS/ÂΜL (ref 0.6–4.47)
LYMPHOCYTES NFR BLD AUTO: 26 % (ref 14–44)
MCH RBC QN AUTO: 27.3 PG (ref 26.8–34.3)
MCHC RBC AUTO-ENTMCNC: 33.3 G/DL (ref 31.4–37.4)
MCV RBC AUTO: 82 FL (ref 82–98)
MONOCYTES # BLD AUTO: 0.62 THOUSAND/ÂΜL (ref 0.17–1.22)
MONOCYTES NFR BLD AUTO: 10 % (ref 4–12)
MUCOUS THREADS UR QL AUTO: ABNORMAL
NEUTROPHILS # BLD AUTO: 3.93 THOUSANDS/ÂΜL (ref 1.85–7.62)
NEUTS SEG NFR BLD AUTO: 61 % (ref 43–75)
NITRITE UR QL STRIP: NEGATIVE
NON-SQ EPI CELLS URNS QL MICRO: ABNORMAL /HPF
NRBC BLD AUTO-RTO: 0 /100 WBCS
PH UR STRIP.AUTO: 6 [PH]
PLATELET # BLD AUTO: 212 THOUSANDS/UL (ref 149–390)
PMV BLD AUTO: 10.5 FL (ref 8.9–12.7)
POTASSIUM SERPL-SCNC: 4.3 MMOL/L (ref 3.5–5.3)
PROT SERPL-MCNC: 7.3 G/DL (ref 6.4–8.4)
PROT UR STRIP-MCNC: ABNORMAL MG/DL
RBC # BLD AUTO: 6.08 MILLION/UL (ref 3.88–5.62)
RBC #/AREA URNS AUTO: ABNORMAL /HPF
SODIUM SERPL-SCNC: 138 MMOL/L (ref 135–147)
SP GR UR STRIP.AUTO: 1.03 (ref 1–1.03)
TRIGL SERPL-MCNC: 271 MG/DL
TSH SERPL DL<=0.05 MIU/L-ACNC: 2.45 UIU/ML (ref 0.45–4.5)
UROBILINOGEN UR STRIP-ACNC: <2 MG/DL
WBC # BLD AUTO: 6.42 THOUSAND/UL (ref 4.31–10.16)
WBC #/AREA URNS AUTO: ABNORMAL /HPF

## 2023-03-28 ENCOUNTER — APPOINTMENT (OUTPATIENT)
Dept: RADIOLOGY | Facility: CLINIC | Age: 38
End: 2023-03-28

## 2023-03-28 ENCOUNTER — TELEPHONE (OUTPATIENT)
Dept: FAMILY MEDICINE CLINIC | Facility: CLINIC | Age: 38
End: 2023-03-28

## 2023-03-28 DIAGNOSIS — R05.1 ACUTE COUGH: ICD-10-CM

## 2023-03-28 DIAGNOSIS — R09.81 SINUS CONGESTION: Primary | ICD-10-CM

## 2023-03-28 DIAGNOSIS — R09.81 SINUS CONGESTION: ICD-10-CM

## 2023-03-28 NOTE — TELEPHONE ENCOUNTER
Spoke to patient and let him know xray of chest and sinus ordered and can go to care now to get them done open till 8pm on [address]

## 2023-03-28 NOTE — TELEPHONE ENCOUNTER
Tried to reach patient on cell phone  LM on home answering machine requesting patient call back so I can tell him that Isi Izzy wants him to have chest and sinus xray

## 2023-03-28 NOTE — TELEPHONE ENCOUNTER
"Patient has finished the 2nd round of antibiotics  His symptoms have persisted and, actually, worsened  He complained of:  Congestion, wheezing, sleeplessness, dry cough, throat \"on fire\"  What's the next step? He said you might want him to see ENT    "

## 2023-03-30 ENCOUNTER — TELEPHONE (OUTPATIENT)
Dept: FAMILY MEDICINE CLINIC | Facility: CLINIC | Age: 38
End: 2023-03-30

## 2023-03-30 NOTE — TELEPHONE ENCOUNTER
Patient is asking if you got results from xray? Patients chest is starting to hurt from coughing all the time

## 2023-04-04 ENCOUNTER — TELEPHONE (OUTPATIENT)
Dept: FAMILY MEDICINE CLINIC | Facility: CLINIC | Age: 38
End: 2023-04-04

## 2023-04-04 DIAGNOSIS — J01.00 ACUTE NON-RECURRENT MAXILLARY SINUSITIS: Primary | ICD-10-CM

## 2023-04-04 RX ORDER — CLARITHROMYCIN 500 MG/1
500 TABLET, COATED ORAL EVERY 12 HOURS SCHEDULED
Qty: 28 TABLET | Refills: 0 | Status: SHIPPED | OUTPATIENT
Start: 2023-04-04 | End: 2023-04-18

## 2023-04-04 RX ORDER — PREDNISONE 10 MG/1
TABLET ORAL
Qty: 20 TABLET | Refills: 0 | Status: SHIPPED | OUTPATIENT
Start: 2023-04-04 | End: 2023-07-14 | Stop reason: ALTCHOICE

## 2023-04-04 NOTE — TELEPHONE ENCOUNTER
----- Message from Dorita Montero PA-C sent at 4/4/2023 11:14 AM EDT -----  Please let patient know he does have a sinus infection  I am going to call in a prednisone taper of 4-4-3-3-2-2-1-1 and antibiotic for 2 weeks  Biaxin 500 mg bid x 14 days  If no better then we will refer to ENT

## 2023-07-14 ENCOUNTER — OFFICE VISIT (OUTPATIENT)
Dept: FAMILY MEDICINE CLINIC | Facility: CLINIC | Age: 38
End: 2023-07-14
Payer: COMMERCIAL

## 2023-07-14 VITALS
HEART RATE: 88 BPM | RESPIRATION RATE: 16 BRPM | TEMPERATURE: 98.8 F | OXYGEN SATURATION: 96 % | HEIGHT: 72 IN | BODY MASS INDEX: 42.66 KG/M2 | DIASTOLIC BLOOD PRESSURE: 92 MMHG | WEIGHT: 315 LBS | SYSTOLIC BLOOD PRESSURE: 132 MMHG

## 2023-07-14 DIAGNOSIS — J01.00 ACUTE NON-RECURRENT MAXILLARY SINUSITIS: Primary | ICD-10-CM

## 2023-07-14 DIAGNOSIS — J02.9 SORE THROAT: ICD-10-CM

## 2023-07-14 DIAGNOSIS — H57.89 ITCHY, WATERY, AND RED EYE: ICD-10-CM

## 2023-07-14 LAB
S PYO AG THROAT QL: NEGATIVE
SARS-COV-2 AG UPPER RESP QL IA: NEGATIVE
VALID CONTROL: NORMAL

## 2023-07-14 PROCEDURE — 99214 OFFICE O/P EST MOD 30 MIN: CPT | Performed by: PHYSICIAN ASSISTANT

## 2023-07-14 PROCEDURE — 87811 SARS-COV-2 COVID19 W/OPTIC: CPT | Performed by: PHYSICIAN ASSISTANT

## 2023-07-14 PROCEDURE — 87880 STREP A ASSAY W/OPTIC: CPT | Performed by: PHYSICIAN ASSISTANT

## 2023-07-14 RX ORDER — CEFUROXIME AXETIL 500 MG/1
500 TABLET ORAL EVERY 12 HOURS SCHEDULED
Qty: 20 TABLET | Refills: 0 | Status: SHIPPED | OUTPATIENT
Start: 2023-07-14 | End: 2023-07-24

## 2023-07-14 NOTE — PROGRESS NOTES
Assessment/Plan:    1. Sinusitis - will treat with ceftin 1 tab twice daily for 10 days, fluids, rest, mucniex    F/u as needed    Subjective:   Chief Complaint   Patient presents with   • Otitis Media     Left ear, teeth were hurting    • Sore Throat     A week   • Fatigue   • Tinnitus   • Conjunctivitis     Right eye       Patient ID: Tabitha Wagner is a 44 y.o. male. Patient here with sinus congestion for a week, tried OTC without relief. Started as a sore throat, progressed into sinus pain, pressure, some dizziness with waking this morning. Mucus thick yellow/green. Coughing up post nasal drip, a lot sinus pressure today. Past few days increase eye crusting.       The following portions of the patient's history were reviewed and updated as appropriate: allergies, current medications, past family history, past medical history, past social history, past surgical history and problem list.    Past Medical History:   Diagnosis Date   • Allergic      Past Surgical History:   Procedure Laterality Date   • KNEE SURGERY      right knee ACL reconstruction 1999     Family History   Problem Relation Age of Onset   • Appendicitis Mother    • Other Mother         Kidney transplanted   • Lupus Mother    • Breast cancer Mother    • Raynaud syndrome Mother    • Kidney failure Mother    • Depression Mother    • Anxiety disorder Mother    • No Known Problems Father    • Depression Maternal Grandmother    • Diabetes Other    • Hearing loss Paternal Grandfather    • Depression Brother    • Alcohol abuse Neg Hx    • Substance Abuse Neg Hx    • Mental illness Neg Hx      Social History     Socioeconomic History   • Marital status: /Civil Union     Spouse name: Not on file   • Number of children: Not on file   • Years of education: Not on file   • Highest education level: Not on file   Occupational History   • Not on file   Tobacco Use   • Smoking status: Former   • Smokeless tobacco: Never   Vaping Use   • Vaping Use: Never used   Substance and Sexual Activity   • Alcohol use: Not Currently     Comment: drinks beer 1-2 times a week    • Drug use: No   • Sexual activity: Not on file   Other Topics Concern   • Not on file   Social History Narrative    Always uses seat belt    Daily caffeine consumption 1 serving a day    Has smoke detectors     Social Determinants of Health     Financial Resource Strain: Not on file   Food Insecurity: Not on file   Transportation Needs: Not on file   Physical Activity: Not on file   Stress: Not on file   Social Connections: Not on file   Intimate Partner Violence: Not on file   Housing Stability: Not on file       Current Outpatient Medications:   •  fluticasone (FLONASE) 50 mcg/act nasal spray, 1 spray into each nostril daily (Patient not taking: Reported on 7/14/2023), Disp: 18.2 mL, Rfl: 1    Review of Systems          Objective:    Vitals:    07/14/23 1151   BP: 132/92   Pulse: 88   Resp: 16   Temp: 98.8 °F (37.1 °C)   SpO2: 96%   Weight: (!) 165 kg (363 lb 1.6 oz)   Height: 6' (1.829 m)        Physical Exam      Constitutional: Patient is oriented to person, place, and time. appears well-developed and well-nourished. HENT:   Head: Normocephalic and atraumatic. Right Ear: Tympanic membrane, external ear and ear canal normal.   Left Ear: Tympanic membrane, external ear and ear canal normal.   Nose: Mucosal edema present. Mouth/Throat: Posterior oropharyngeal erythema present. Turbinates inflamed with purulent mucus, pharynx with purulent post nasal drip and erythema   Eyes: Conjunctivae are normal.   Neck: Neck supple. Cardiovascular: Normal rate, regular rhythm and normal heart sounds. Pulmonary/Chest: Effort normal and breath sounds normal.   Lymphadenopathy: no cervical adenopathy. Neurological: Patient is alert and oriented to person, place, and time. Skin: Skin is warm. Psychiatric: Patient has a normal mood and affect.

## 2023-07-14 NOTE — LETTER
July 14, 2023     Patient: Shamir Paz  YOB: 1985  Date of Visit: 7/14/2023      To Whom it May Concern:    Ziggy Musa is under my professional care. Antoinerilayla Gaona was seen in my office on 7/14/2023. Elyse Gaona may return to work on 7/17/2023 . If you have any questions or concerns, please don't hesitate to call.          Sincerely,          Raina Harris PA-C        CC: No Recipients

## 2023-10-17 ENCOUNTER — OFFICE VISIT (OUTPATIENT)
Dept: FAMILY MEDICINE CLINIC | Facility: CLINIC | Age: 38
End: 2023-10-17
Payer: COMMERCIAL

## 2023-10-17 VITALS
HEIGHT: 72 IN | HEART RATE: 87 BPM | TEMPERATURE: 99.3 F | DIASTOLIC BLOOD PRESSURE: 96 MMHG | SYSTOLIC BLOOD PRESSURE: 144 MMHG | RESPIRATION RATE: 19 BRPM | WEIGHT: 315 LBS | OXYGEN SATURATION: 96 % | BODY MASS INDEX: 42.66 KG/M2

## 2023-10-17 DIAGNOSIS — R05.1 ACUTE COUGH: Primary | ICD-10-CM

## 2023-10-17 DIAGNOSIS — R50.9 FEVER, UNSPECIFIED FEVER CAUSE: ICD-10-CM

## 2023-10-17 PROCEDURE — 87636 SARSCOV2 & INF A&B AMP PRB: CPT | Performed by: NURSE PRACTITIONER

## 2023-10-17 PROCEDURE — 99214 OFFICE O/P EST MOD 30 MIN: CPT | Performed by: NURSE PRACTITIONER

## 2023-10-17 RX ORDER — PREDNISONE 10 MG/1
TABLET ORAL
Qty: 20 TABLET | Refills: 0 | Status: SHIPPED | OUTPATIENT
Start: 2023-10-17

## 2023-10-17 RX ORDER — ALBUTEROL SULFATE 90 UG/1
2 AEROSOL, METERED RESPIRATORY (INHALATION) EVERY 6 HOURS PRN
Qty: 8 G | Refills: 0 | Status: SHIPPED | OUTPATIENT
Start: 2023-10-17

## 2023-10-17 NOTE — PROGRESS NOTES
Assessment/Plan:     Diagnoses and all orders for this visit:    Acute cough  -     Covid/Flu- Office Collect  -     predniSONE 10 mg tablet; Take 4 tablets with food on days 1 & 2. Then take 3 tablets with food on days 3 &4. Then take 2 tablets with food on days 5 & 6. Then take 1 tablet with food on days 7 & 8.  -     albuterol (PROVENTIL HFA,VENTOLIN HFA) 90 mcg/act inhaler; Inhale 2 puffs every 6 (six) hours as needed for wheezing or shortness of breath    Covid/flu swab sent. Albuterol inhaler refilled. Prednisone taper prescribed. Medication and s/e reviewed. Pt to rest and keep well hydrated. He may continue his Mucinex cold medication OTC. We will contact pt w/ results once available. Patient is encouraged to call our office for any questions/concerns, persistent or worsening symptoms. Patient states they understand and agree with treatment plan. Fever, unspecified fever cause      Same as above. Pt to f/u PRN. F/u pending results. Subjective:      Patient ID: Sen Jackson is a 44 y.o. male. Pt presents today for symptoms of nasal congestion that initially started last Thursday/Friday. He also notes a scratchy throat which improved. Over the last few days, his symptoms have worsened and seemed to go to his chest.  Pt notes a productive cough w/ thick green/brown mucus. Pt also notes some shortness of breath w/ exertion and wheezing. Pt also notes some fever, chills worse yesterday. Pt had negative covid test yesterday. He does note he believes his coworker had the flu and he was around him late last week at work. The following portions of the patient's history were reviewed and updated as appropriate: allergies, current medications, past family history, past medical history, past social history, past surgical history, and problem list.    Review of Systems    As noted per HPI.     Objective:      /96   Pulse 87   Temp 99.3 °F (37.4 °C) (Oral)   Resp 19   Ht 6' (1.829 m)   Wt (!) 166 kg (367 lb)   SpO2 96%   BMI 49.77 kg/m²          Physical Exam  Vitals reviewed. Constitutional:       General: He is not in acute distress. Appearance: Normal appearance. He is well-developed. He is not ill-appearing. HENT:      Right Ear: Tympanic membrane, ear canal and external ear normal.      Left Ear: Tympanic membrane, ear canal and external ear normal.      Nose: No congestion or rhinorrhea. Mouth/Throat:      Pharynx: No oropharyngeal exudate or posterior oropharyngeal erythema. Cardiovascular:      Rate and Rhythm: Normal rate and regular rhythm. Pulses: Normal pulses. Heart sounds: Normal heart sounds. Pulmonary:      Effort: Pulmonary effort is normal. No respiratory distress. Breath sounds: No wheezing. Comments: Coarse breath sounds linda in all lung fields  Lymphadenopathy:      Cervical: No cervical adenopathy. Neurological:      General: No focal deficit present. Mental Status: He is alert and oriented to person, place, and time.    Psychiatric:         Mood and Affect: Mood normal.         Behavior: Behavior normal.

## 2023-10-18 ENCOUNTER — TELEPHONE (OUTPATIENT)
Dept: FAMILY MEDICINE CLINIC | Facility: CLINIC | Age: 38
End: 2023-10-18

## 2023-10-18 NOTE — TELEPHONE ENCOUNTER
Pt is interested in having antibiotic prescribed you talked about yesterday - the prednisone is helping his breathing, but is still coughing mucus up. He uses [facility] in [place].

## 2023-10-19 DIAGNOSIS — R05.1 ACUTE COUGH: Primary | ICD-10-CM

## 2023-10-19 RX ORDER — AZITHROMYCIN 250 MG/1
TABLET, FILM COATED ORAL
Qty: 6 TABLET | Refills: 0 | Status: SHIPPED | OUTPATIENT
Start: 2023-10-19 | End: 2023-10-24

## 2023-10-30 ENCOUNTER — TELEPHONE (OUTPATIENT)
Dept: FAMILY MEDICINE CLINIC | Facility: CLINIC | Age: 38
End: 2023-10-30

## 2023-10-30 NOTE — TELEPHONE ENCOUNTER
Pt called to be seen to have staples removed from a work accident. I advised him to call his HR dept for advice on who to see. I also gave him [facility] phone # in case he is able to use [facility].

## 2023-10-31 ENCOUNTER — APPOINTMENT (OUTPATIENT)
Dept: URGENT CARE | Facility: CLINIC | Age: 38
End: 2023-10-31
Payer: OTHER MISCELLANEOUS

## 2023-10-31 PROCEDURE — 99283 EMERGENCY DEPT VISIT LOW MDM: CPT

## 2023-10-31 PROCEDURE — G0382 LEV 3 HOSP TYPE B ED VISIT: HCPCS

## 2023-11-06 ENCOUNTER — APPOINTMENT (OUTPATIENT)
Dept: URGENT CARE | Facility: CLINIC | Age: 38
End: 2023-11-06
Payer: OTHER MISCELLANEOUS

## 2023-11-06 PROCEDURE — 99213 OFFICE O/P EST LOW 20 MIN: CPT | Performed by: PHYSICIAN ASSISTANT

## 2023-11-13 ENCOUNTER — APPOINTMENT (OUTPATIENT)
Dept: URGENT CARE | Facility: CLINIC | Age: 38
End: 2023-11-13
Payer: OTHER MISCELLANEOUS

## 2023-11-13 PROCEDURE — 99213 OFFICE O/P EST LOW 20 MIN: CPT | Performed by: PHYSICIAN ASSISTANT

## 2023-11-20 ENCOUNTER — APPOINTMENT (OUTPATIENT)
Dept: URGENT CARE | Facility: CLINIC | Age: 38
End: 2023-11-20

## 2024-01-02 ENCOUNTER — OFFICE VISIT (OUTPATIENT)
Dept: FAMILY MEDICINE CLINIC | Facility: CLINIC | Age: 39
End: 2024-01-02
Payer: COMMERCIAL

## 2024-01-02 ENCOUNTER — NURSE TRIAGE (OUTPATIENT)
Dept: OTHER | Facility: OTHER | Age: 39
End: 2024-01-02

## 2024-01-02 ENCOUNTER — HOSPITAL ENCOUNTER (OUTPATIENT)
Dept: CT IMAGING | Facility: HOSPITAL | Age: 39
Discharge: HOME | End: 2024-01-02
Payer: COMMERCIAL

## 2024-01-02 ENCOUNTER — APPOINTMENT (OUTPATIENT)
Dept: LAB | Facility: HOSPITAL | Age: 39
End: 2024-01-02
Payer: COMMERCIAL

## 2024-01-02 VITALS
RESPIRATION RATE: 17 BRPM | HEIGHT: 72 IN | TEMPERATURE: 96.4 F | WEIGHT: 315 LBS | HEART RATE: 81 BPM | OXYGEN SATURATION: 97 % | BODY MASS INDEX: 42.66 KG/M2 | DIASTOLIC BLOOD PRESSURE: 100 MMHG | SYSTOLIC BLOOD PRESSURE: 132 MMHG

## 2024-01-02 DIAGNOSIS — R10.9 RIGHT FLANK PAIN: ICD-10-CM

## 2024-01-02 DIAGNOSIS — R16.0 HEPATOMEGALY: Primary | ICD-10-CM

## 2024-01-02 DIAGNOSIS — R10.11 RUQ PAIN: ICD-10-CM

## 2024-01-02 DIAGNOSIS — R10.31 RLQ ABDOMINAL PAIN: ICD-10-CM

## 2024-01-02 DIAGNOSIS — R10.9 RIGHT FLANK PAIN: Primary | ICD-10-CM

## 2024-01-02 LAB
ALBUMIN SERPL BCP-MCNC: 4.3 G/DL (ref 3.5–5)
ALP SERPL-CCNC: 44 U/L (ref 34–104)
ALT SERPL W P-5'-P-CCNC: 48 U/L (ref 7–52)
AMYLASE SERPL-CCNC: 56 IU/L (ref 29–103)
ANION GAP SERPL CALCULATED.3IONS-SCNC: 7 MMOL/L
AST SERPL W P-5'-P-CCNC: 28 U/L (ref 13–39)
BASOPHILS # BLD AUTO: 0.04 THOUSANDS/ÂΜL (ref 0–0.1)
BASOPHILS NFR BLD AUTO: 1 % (ref 0–1)
BILIRUB SERPL-MCNC: 0.44 MG/DL (ref 0.2–1)
BILIRUB UR QL STRIP: NEGATIVE
BUN SERPL-MCNC: 16 MG/DL (ref 5–25)
CALCIUM SERPL-MCNC: 9.4 MG/DL (ref 8.4–10.2)
CHLORIDE SERPL-SCNC: 104 MMOL/L (ref 96–108)
CLARITY UR: CLEAR
CO2 SERPL-SCNC: 27 MMOL/L (ref 21–32)
COLOR UR: YELLOW
CREAT SERPL-MCNC: 0.94 MG/DL (ref 0.6–1.3)
EOSINOPHIL # BLD AUTO: 0.08 THOUSAND/ÂΜL (ref 0–0.61)
EOSINOPHIL NFR BLD AUTO: 1 % (ref 0–6)
ERYTHROCYTE [DISTWIDTH] IN BLOOD BY AUTOMATED COUNT: 13.8 % (ref 11.6–15.1)
GFR SERPL CREATININE-BSD FRML MDRD: 101 ML/MIN/1.73SQ M
GLUCOSE P FAST SERPL-MCNC: 102 MG/DL (ref 65–99)
GLUCOSE UR STRIP-MCNC: NEGATIVE MG/DL
HCT VFR BLD AUTO: 49.2 % (ref 36.5–49.3)
HGB BLD-MCNC: 16.4 G/DL (ref 12–17)
HGB UR QL STRIP.AUTO: NEGATIVE
IMM GRANULOCYTES # BLD AUTO: 0.03 THOUSAND/UL (ref 0–0.2)
IMM GRANULOCYTES NFR BLD AUTO: 0 % (ref 0–2)
KETONES UR STRIP-MCNC: NEGATIVE MG/DL
LEUKOCYTE ESTERASE UR QL STRIP: NEGATIVE
LIPASE SERPL-CCNC: 127 U/L (ref 11–82)
LYMPHOCYTES # BLD AUTO: 1.95 THOUSANDS/ÂΜL (ref 0.6–4.47)
LYMPHOCYTES NFR BLD AUTO: 26 % (ref 14–44)
MCH RBC QN AUTO: 27.5 PG (ref 26.8–34.3)
MCHC RBC AUTO-ENTMCNC: 33.3 G/DL (ref 31.4–37.4)
MCV RBC AUTO: 82 FL (ref 82–98)
MONOCYTES # BLD AUTO: 0.74 THOUSAND/ÂΜL (ref 0.17–1.22)
MONOCYTES NFR BLD AUTO: 10 % (ref 4–12)
NEUTROPHILS # BLD AUTO: 4.79 THOUSANDS/ÂΜL (ref 1.85–7.62)
NEUTS SEG NFR BLD AUTO: 62 % (ref 43–75)
NITRITE UR QL STRIP: NEGATIVE
NRBC BLD AUTO-RTO: 0 /100 WBCS
PH UR STRIP.AUTO: 5.5 [PH]
PLATELET # BLD AUTO: 227 THOUSANDS/UL (ref 149–390)
PMV BLD AUTO: 9.9 FL (ref 8.9–12.7)
POTASSIUM SERPL-SCNC: 4.6 MMOL/L (ref 3.5–5.3)
PROT SERPL-MCNC: 7.5 G/DL (ref 6.4–8.4)
PROT UR STRIP-MCNC: NEGATIVE MG/DL
RBC # BLD AUTO: 5.97 MILLION/UL (ref 3.88–5.62)
SODIUM SERPL-SCNC: 138 MMOL/L (ref 135–147)
SP GR UR STRIP.AUTO: 1.02 (ref 1–1.03)
UROBILINOGEN UR STRIP-ACNC: <2 MG/DL
WBC # BLD AUTO: 7.63 THOUSAND/UL (ref 4.31–10.16)

## 2024-01-02 PROCEDURE — 82150 ASSAY OF AMYLASE: CPT

## 2024-01-02 PROCEDURE — G1004 CDSM NDSC: HCPCS

## 2024-01-02 PROCEDURE — 81003 URINALYSIS AUTO W/O SCOPE: CPT | Performed by: NURSE PRACTITIONER

## 2024-01-02 PROCEDURE — 74177 CT ABD & PELVIS W/CONTRAST: CPT

## 2024-01-02 PROCEDURE — 83690 ASSAY OF LIPASE: CPT

## 2024-01-02 PROCEDURE — 36415 COLL VENOUS BLD VENIPUNCTURE: CPT

## 2024-01-02 PROCEDURE — 80053 COMPREHEN METABOLIC PANEL: CPT

## 2024-01-02 PROCEDURE — 99214 OFFICE O/P EST MOD 30 MIN: CPT | Performed by: NURSE PRACTITIONER

## 2024-01-02 PROCEDURE — 85025 COMPLETE CBC W/AUTO DIFF WBC: CPT

## 2024-01-02 RX ADMIN — IOHEXOL 50 ML: 240 INJECTION, SOLUTION INTRATHECAL; INTRAVASCULAR; INTRAVENOUS; ORAL at 14:05

## 2024-01-02 RX ADMIN — IOHEXOL 100 ML: 350 INJECTION, SOLUTION INTRAVENOUS at 14:05

## 2024-01-02 NOTE — PROGRESS NOTES
Assessment/Plan:     Diagnoses and all orders for this visit:    Right flank pain  -     CBC and differential; Future  -     Comprehensive metabolic panel; Future  -     Lipase; Future  -     Amylase; Future  -     CT abdomen pelvis w contrast; Future  -     UA w Reflex to Microscopic w Reflex to Culture    Unclear etiology at this time. Since patient has not been eating any substantial food since Saturday and given 1 week of worsening right flank, RUQ and RLQ pain, will check labs STAT along w/ CT abdomen/pelvis. We will contact pt with results once available. Patient is encouraged to call our office for any questions/concerns, persistent or worsening symptoms. Patient states they understand and agree with treatment plan.     RLQ abdominal pain  -     CBC and differential; Future  -     Comprehensive metabolic panel; Future  -     Lipase; Future  -     Amylase; Future  -     CT abdomen pelvis w contrast; Future  -     UA w Reflex to Microscopic w Reflex to Culture    Plan as above.    RUQ pain  -     CBC and differential; Future  -     Comprehensive metabolic panel; Future  -     Lipase; Future  -     Amylase; Future  -     CT abdomen pelvis w contrast; Future  -     UA w Reflex to Microscopic w Reflex to Culture      Plan as above.        Pt to f/u PRN.   F/u pending results.    Subjective:      Patient ID: David Ferguson is a 38 y.o. male.    Pt presents today for on/off for right sided back pain, RUQ and RLQ abdominal pain x 1 week.  He admits his pain level can span from 2-8/10. Sometimes he will get severe stabbing pain.  Sometimes the pain can be dull.  Pt denies blood in the stool. He does note yellow mucus in his stools and admits he has had looser stools the last week.  Pt has had zero appetite.  He denies nausea/vomiting.  Pt does feel bloated/distended.  Pt also noted several days ago some bruising and petechiae to her RUQ/flank area.  He denies any injury.  He notes it had gone away on its own and  is not present today.  He also admits to chills but denies fever.  Additionally, pt had some cloudy urine but denies hematuria.  Pt had small soup and granola yesterday however has not been eating really anything since Saturday.  Pt denies any ETOH use.  He had a RUQ u/s last year which showed a moderate fatty liver.  Pt notes no other GI issues.  Pt does admit his mother had 4 kidney transplants.  Hi MGF had colon issues.          The following portions of the patient's history were reviewed and updated as appropriate: allergies, current medications, past family history, past medical history, past social history, past surgical history, and problem list.    Review of Systems    As noted per HPI.    Objective:      /100   Pulse 81   Temp (!) 96.4 °F (35.8 °C)   Resp 17   Ht 6' (1.829 m)   Wt (!) 166 kg (365 lb)   SpO2 97%   BMI 49.50 kg/m²          Physical Exam  Vitals reviewed.   Constitutional:       Appearance: He is well-developed.      Comments: Patient appears visibly uncomfortable w/ facial grimacing when using abdominal muscles and taking deep breaths   Cardiovascular:      Rate and Rhythm: Normal rate and regular rhythm.      Pulses: Normal pulses.      Heart sounds: Normal heart sounds.   Pulmonary:      Effort: Pulmonary effort is normal.      Breath sounds: Normal breath sounds.   Abdominal:      General: Bowel sounds are decreased.      Tenderness: There is abdominal tenderness in the right upper quadrant and right lower quadrant. There is right CVA tenderness.      Comments: Abdomen in distended.  No bruising to abdomen noted.   Musculoskeletal:      Right lower leg: No edema.      Left lower leg: No edema.   Neurological:      Mental Status: He is alert and oriented to person, place, and time.   Psychiatric:         Mood and Affect: Mood normal.         Behavior: Behavior normal.         Thought Content: Thought content normal.         Judgment: Judgment normal.

## 2024-01-02 NOTE — TELEPHONE ENCOUNTER
"Reason for Disposition   [1] MILD-MODERATE pain AND [2] constant AND [3] present > 2 hours    Answer Assessment - Initial Assessment Questions  1. LOCATION: \"Where does it hurt?\"       Lower abdomen, then to RLQ into his back  2. RADIATION: \"Does the pain shoot anywhere else?\" (e.g., chest, back)      Around to his back  3. ONSET: \"When did the pain begin?\" (Minutes, hours or days ago)       One week ago  4. SUDDEN: \"Gradual or sudden onset?\"      Gradual  5. PATTERN \"Does the pain come and go, or is it constant?\"     - If constant: \"Is it getting better, staying the same, or worsening?\"       (Note: Constant means the pain never goes away completely; most serious pain is constant and it progresses)      - If intermittent: \"How long does it last?\" \"Do you have pain now?\"      (Note: Intermittent means the pain goes away completely between bouts)      Comes and goes  6. SEVERITY: \"How bad is the pain?\"  (e.g., Scale 1-10; mild, moderate, or severe)     - MILD (1-3): doesn't interfere with normal activities, abdomen soft and not tender to touch      - MODERATE (4-7): interferes with normal activities or awakens from sleep, tender to touch      - SEVERE (8-10): excruciating pain, doubled over, unable to do any normal activities        #8 on occasion and mostly dull annoying pain -#2  7. RECURRENT SYMPTOM: \"Have you ever had this type of stomach pain before?\" If Yes, ask: \"When was the last time?\" and \"What happened that time?\"       different  8. CAUSE: \"What do you think is causing the stomach pain?\"      Unsure  9. RELIEVING/AGGRAVATING FACTORS: \"What makes it better or worse?\" (e.g., movement, antacids, bowel movement)      Unknown  10. OTHER SYMPTOMS: \"Has there been any vomiting, diarrhea, constipation, or urine problems?\"        Decreased appetite, very gassy, urgency to pass stool a lot, looser yellowish mucousy stools passed not on his schedule.    Protocols used: Abdominal Pain - Male-ADULT-AH    "

## 2024-01-03 ENCOUNTER — OFFICE VISIT (OUTPATIENT)
Dept: GASTROENTEROLOGY | Facility: CLINIC | Age: 39
End: 2024-01-03
Payer: COMMERCIAL

## 2024-01-03 ENCOUNTER — APPOINTMENT (OUTPATIENT)
Dept: LAB | Facility: CLINIC | Age: 39
End: 2024-01-03
Payer: COMMERCIAL

## 2024-01-03 ENCOUNTER — APPOINTMENT (OUTPATIENT)
Dept: RADIOLOGY | Facility: CLINIC | Age: 39
End: 2024-01-03
Payer: COMMERCIAL

## 2024-01-03 VITALS
BODY MASS INDEX: 42.66 KG/M2 | HEIGHT: 72 IN | SYSTOLIC BLOOD PRESSURE: 144 MMHG | DIASTOLIC BLOOD PRESSURE: 90 MMHG | TEMPERATURE: 98.2 F | WEIGHT: 315 LBS

## 2024-01-03 DIAGNOSIS — R74.8 ELEVATED LIPASE: ICD-10-CM

## 2024-01-03 DIAGNOSIS — R10.9 RIGHT FLANK PAIN: ICD-10-CM

## 2024-01-03 DIAGNOSIS — R10.11 RUQ PAIN: ICD-10-CM

## 2024-01-03 DIAGNOSIS — R16.0 HEPATOMEGALY: ICD-10-CM

## 2024-01-03 DIAGNOSIS — K59.00 CONSTIPATION, UNSPECIFIED CONSTIPATION TYPE: ICD-10-CM

## 2024-01-03 DIAGNOSIS — K59.00 CONSTIPATION, UNSPECIFIED CONSTIPATION TYPE: Primary | ICD-10-CM

## 2024-01-03 LAB — LIPASE SERPL-CCNC: 28 U/L (ref 11–82)

## 2024-01-03 PROCEDURE — 83690 ASSAY OF LIPASE: CPT

## 2024-01-03 PROCEDURE — 99204 OFFICE O/P NEW MOD 45 MIN: CPT | Performed by: PHYSICIAN ASSISTANT

## 2024-01-03 PROCEDURE — 74018 RADEX ABDOMEN 1 VIEW: CPT

## 2024-01-03 PROCEDURE — 36415 COLL VENOUS BLD VENIPUNCTURE: CPT

## 2024-01-03 RX ORDER — POLYETHYLENE GLYCOL 3350 17 G/17G
17 POWDER, FOR SOLUTION ORAL DAILY
Qty: 100 EACH | Refills: 2 | Status: SHIPPED | OUTPATIENT
Start: 2024-01-03

## 2024-01-03 NOTE — PROGRESS NOTES
"[facility] - Outpatient Consultation  David Ferguson 38 y.o. male MRN: [id number]  Encounter: [id number]          ASSESSMENT AND PLAN:      1. R flank pain, intermittent  2. Intermittent constipation   3. Hepatomegaly   Pt says that over the last year or so, he gets intermittent bouts of R flank pain and constipation that occurs once every few months. He underwent abdominal u/s for this in the past, which noted hepatomegaly but otherwise was normal. He says he did not have any symptoms until a few days ago, so his PCP ordered CT, which again noted fatty liver and hepatomegaly but otherwise normal. Lipase mildly elevated ar 127; LFTs WNL. Pt denies alcohol use in months and denies any OTC supplements or new meds. He says he has noticed that he will be struggling with constipation \"for a bit\" prior to the pain starting.   -explained to pt that the liver usually must be actively inflamed in order for it to be the cause of his pain and from his imaging + blood work, this does not appear to be the case . I explained healthy diet and exercise and we can discuss elastography at his next appt  -explained that holding onto stool/being constipated can certainly be the cause of his pain  -increase daily water intake to at least 64 ounces/day  -start miralax PRN constipation or days of incomplete bowel evacuation  -KUB ordered to evaluate stool burden + kidney stones     4. Elevated lipase  Lipase mildly elevated; not to the level of concern for pancreatitis.   - repeat lipase     ______________________________________________________________________    HPI:  Pt says that over the last year or so, he gets intermittent bouts of R flank pain and constipation that occurs once every few months. He underwent abdominal u/s for this in the past, which noted hepatomegaly but otherwise was normal. He says he did not have any symptoms until a few days ago, so his PCP ordered CT. Pt denies alcohol use in " "months and denies any OTC supplements or new meds. He says he has noticed that he will be struggling with constipation \"for a bit\" prior to the pain starting.  Pt says he only gets heartburn once every couple of weeks and denies n/v, dysphagia, family hx of colon cancer, unintentional weight loss, fevers, chills, night sweats, NSAID use, diarrhea, bloody or black BM. He denies tobacco and alcohol use. He denies prior abdominal surgical hx.      REVIEW OF SYSTEMS:    CONSTITUTIONAL: Denies any fever, chills, rigors, and weight loss.  HEENT: No earache or tinnitus. Denies hearing loss or visual disturbances.  CARDIOVASCULAR: No chest pain or palpitations.   RESPIRATORY: Denies any cough, hemoptysis, shortness of breath or dyspnea on exertion.  GASTROINTESTINAL: As noted in the History of Present Illness.   GENITOURINARY: No problems with urination. Denies any hematuria or dysuria.  NEUROLOGIC: No dizziness or vertigo, denies headaches.   MUSCULOSKELETAL: Denies any muscle or joint pain.   SKIN: Denies skin rashes or itching.   ENDOCRINE: Denies excessive thirst. Denies intolerance to heat or cold.  PSYCHOSOCIAL: Denies depression or anxiety. Denies any recent memory loss.       Historical Information   Past Medical History:   Diagnosis Date    Allergic      Past Surgical History:   Procedure Laterality Date    KNEE SURGERY      right knee ACL reconstruction 1999     Social History   Social History     Substance and Sexual Activity   Alcohol Use Not Currently    Comment: drinks beer 1-2 times a week      Social History     Substance and Sexual Activity   Drug Use No     Social History     Tobacco Use   Smoking Status Former   Smokeless Tobacco Never     Family History   Problem Relation Age of Onset    Appendicitis Mother     Other Mother         Kidney transplanted    Lupus Mother     Breast cancer Mother     Raynaud syndrome Mother     Kidney failure Mother     Depression Mother     Anxiety disorder Mother     No " Known Problems Father     Depression Maternal Grandmother     Diabetes Other     Hearing loss Paternal Grandfather     Depression Brother     Alcohol abuse Neg Hx     Substance Abuse Neg Hx     Mental illness Neg Hx        Meds/Allergies       Current Outpatient Medications:     polyethylene glycol (MIRALAX) 17 g packet    albuterol (PROVENTIL HFA,VENTOLIN HFA) 90 mcg/act inhaler    fluticasone (FLONASE) 50 mcg/act nasal spray  No current facility-administered medications for this visit.    Allergies   Allergen Reactions    Shellfish Allergy - Food Allergy Anaphylaxis     Category: Allergy;     Amoxicillin Hives     Category: Allergy;     Pollen Extract Allergic Rhinitis           Objective     Blood pressure 144/90, temperature 98.2 °F (36.8 °C), temperature source Tympanic, height 6' (1.829 m), weight (!) 167 kg (367 lb 9.6 oz). Body mass index is 49.86 kg/m².        PHYSICAL EXAM:      General Appearance:   Alert, cooperative, no distress   HEENT:   Normocephalic, atraumatic, anicteric.     Neck:  Supple, symmetrical, trachea midline   Lungs:   Clear to auscultation bilaterally; no rales, rhonchi or wheezing; respirations unlabored    Heart::   Regular rate and rhythm; no murmur, rub, or gallop.   Abdomen:   Soft, non-tender, non-distended; normal bowel sounds; no masses, no organomegaly    Genitalia:   Deferred    Rectal:   Deferred    Extremities:  No cyanosis, clubbing or edema    Pulses:  2+ and symmetric    Skin:  No jaundice, rashes, or lesions    Lymph nodes:  No palpable cervical lymphadenopathy        Lab Results:   No visits with results within 1 Day(s) from this visit.   Latest known visit with results is:   Appointment on 01/02/2024   Component Date Value    WBC 01/02/2024 7.63     RBC 01/02/2024 5.97 (H)     Hemoglobin 01/02/2024 16.4     Hematocrit 01/02/2024 49.2     MCV 01/02/2024 82     MCH 01/02/2024 27.5     MCHC 01/02/2024 33.3     RDW 01/02/2024 13.8     MPV 01/02/2024 9.9     Platelets  01/02/2024 227     nRBC 01/02/2024 0     Neutrophils Relative 01/02/2024 62     Immat GRANS % 01/02/2024 0     Lymphocytes Relative 01/02/2024 26     Monocytes Relative 01/02/2024 10     Eosinophils Relative 01/02/2024 1     Basophils Relative 01/02/2024 1     Neutrophils Absolute 01/02/2024 4.79     Immature Grans Absolute 01/02/2024 0.03     Lymphocytes Absolute 01/02/2024 1.95     Monocytes Absolute 01/02/2024 0.74     Eosinophils Absolute 01/02/2024 0.08     Basophils Absolute 01/02/2024 0.04     Sodium 01/02/2024 138     Potassium 01/02/2024 4.6     Chloride 01/02/2024 104     CO2 01/02/2024 27     ANION GAP 01/02/2024 7     BUN 01/02/2024 16     Creatinine 01/02/2024 0.94     Glucose, Fasting 01/02/2024 102 (H)     Calcium 01/02/2024 9.4     AST 01/02/2024 28     ALT 01/02/2024 48     Alkaline Phosphatase 01/02/2024 44     Total Protein 01/02/2024 7.5     Albumin 01/02/2024 4.3     Total Bilirubin 01/02/2024 0.44     eGFR 01/02/2024 101     Lipase 01/02/2024 127 (H)     Amylase 01/02/2024 56          Radiology Results:   CT abdomen pelvis w contrast    Result Date: 1/2/2024  Narrative: CT ABDOMEN AND PELVIS WITH IV CONTRAST INDICATION:   R10.9: Unspecified abdominal pain R10.31: Right lower quadrant pain R10.11: Right upper quadrant pain. COMPARISON: No prior CT available for comparison. Correlation with right upper quadrant ultrasound July 22, 2022 TECHNIQUE:  CT examination of the abdomen and pelvis was performed. Multiplanar 2D reformatted images were created from the source data. This examination, like all CT scans performed in the [facility], was performed utilizing techniques to minimize radiation dose exposure, including the use of iterative reconstruction and automated exposure control. Radiation dose length product (DLP) for this visit:  2576 mGy-cm IV Contrast:  50 mL of iohexol (OMNIPAQUE) 100 mL of iohexol (OMNIPAQUE) Enteric Contrast:  Enteric contrast was administered. FINDINGS:  ABDOMEN LOWER CHEST:  No clinically significant abnormality identified in the visualized lower chest. LIVER/BILIARY TREE: No surface nodularity. Hepatic steatosis. Hepatomegaly, measuring 21.8 cm in craniocaudal dimension, previously 20.6 cm on July 22, 2022. No suspicious hepatic lesion. Patent hepatic and portal veins. No biliary ductal dilation. GALLBLADDER:  No calcified gallstones. No pericholecystic inflammatory change. SPLEEN:  Unremarkable. PANCREAS:  Unremarkable. ADRENAL GLANDS:  Unremarkable. KIDNEYS/URETERS:  Unremarkable. No hydronephrosis. STOMACH AND BOWEL: No abnormal mural thickening or luminal dilation of the stomach, small bowel or colon. APPENDIX: A normal appendix was visualized. ABDOMINOPELVIC CAVITY:  No ascites.  No pneumoperitoneum.  No lymphadenopathy. VESSELS:  Unremarkable for patient's age. PELVIS REPRODUCTIVE ORGANS:  Unremarkable for patient's age. URINARY BLADDER:  Unremarkable. ABDOMINAL WALL/INGUINAL REGIONS:  Unremarkable. OSSEOUS STRUCTURES:  No acute fracture or destructive osseous lesion.     Impression: 1.  No acute abdominopelvic pathology. 2.  Hepatic steatosis and hepatomegaly. Workstation performed: [id number]    Answers submitted by the patient for this visit:  Abdominal Pain Questionnaire (Submitted on 1/2/2024)  Chief Complaint: Abdominal pain  Chronicity: recurrent  Onset: more than 1 year ago  Onset quality: sudden  Frequency: daily  Progression since onset: waxing and waning  Pain location: right flank  Pain - numeric: 8/10  Pain quality: a sensation of fullness, sharp  Radiates to: RLQ, RUQ  anorexia: Yes  arthralgias: No  belching: Yes  constipation: Yes  diarrhea: Yes  dysuria: No  fever: Yes  flatus: Yes  frequency: Yes  headaches: No  hematochezia: No  hematuria: No  melena: No  myalgias: No  nausea: Yes  weight loss: No  vomiting: No  Aggravated by: bowel movement, certain positions, coughing, deep breathing, eating  Relieved by: certain positions, passing  flatus  Diagnostic workup: CT scan, ultrasound

## 2024-01-03 NOTE — PATIENT INSTRUCTIONS
Please increase your daily water intake to at least 64 ounces of water/day (8-10 cups of water)  On the days your not moving your bowels OR you only go a small amount, please use miralax   Get blood work and X-RAY done    GERD (Gastroesophageal Reflux Disease)   WHAT YOU NEED TO KNOW:   Gastroesophageal reflux disease (GERD) is reflux that happens more than 2 times a week for a few weeks. Reflux means acid and food in your stomach back up into your esophagus. GERD can cause other health problems over time if it is not treated.       DISCHARGE INSTRUCTIONS:   Call your local emergency number (911 in the ) if:   You have severe chest pain and sudden trouble breathing.      Return to the emergency department if:   You have trouble breathing after you vomit.    You have trouble swallowing, or pain with swallowing.    Your bowel movements are black, bloody, or tarry-looking.    Your vomit looks like coffee grounds or has blood in it.    Call your doctor or gastroenterologist if:   You feel full and cannot burp or vomit.    You vomit large amounts, or you vomit often.    You are losing weight without trying.    Your symptoms get worse or do not improve with treatment.    You have questions or concerns about your condition or care.    Medicines:   Medicines  are used to decrease stomach acid. Medicine may also be used to help your lower esophageal sphincter and stomach contract (tighten) more.    Take your medicine as directed.  Contact your healthcare provider if you think your medicine is not helping or if you have side effects. Tell your provider if you are allergic to any medicine. Keep a list of the medicines, vitamins, and herbs you take. Include the amounts, and when and why you take them. Bring the list or the pill bottles to follow-up visits. Carry your medicine list with you in case of an emergency.    Manage GERD:       Do not have foods or drinks that may increase heartburn.  These include chocolate,  peppermint, fried or fatty foods, drinks that contain caffeine, or carbonated drinks (soda). Other foods include spicy foods, onions, tomatoes, and tomato-based foods. Do not have foods or drinks that can irritate your esophagus, such as citrus fruits, juices, and alcohol.    Do not eat large meals.  When you eat a lot of food at one time, your stomach needs more acid to digest it. Eat 6 small meals each day instead of 3 large ones, and eat slowly. Do not eat meals 2 to 3 hours before bedtime.    Elevate the head of your bed.  Place 6-inch blocks under the head of your bed frame. You may also use more than one pillow under your head and shoulders while you sleep.    Maintain a healthy weight.  If you are overweight, weight loss may help relieve symptoms of GERD.    Do not smoke.  Smoking weakens the lower esophageal sphincter and increases the risk of GERD. Ask your healthcare provider for information if you currently smoke and need help to quit. E-cigarettes or smokeless tobacco still contain nicotine. Talk to your healthcare provider before you use these products.    Do not put pressure on your abdomen.  Pressure pushes acid up into your esophagus. Do not wear clothing that is tight around your waist. Do not bend over. Bend at the knees if you need to pick something up.  Follow up with your doctor or gastroenterologist as directed:  Write down your questions so you remember to ask them during your visits.  © Copyright [employer] 2023 Information is for End User's use only and may not be sold, redistributed or otherwise used for commercial purposes.  The above information is an  only. It is not intended as medical advice for individual conditions or treatments. Talk to your doctor, nurse or pharmacist before following any medical regimen to see if it is safe and effective for you.

## 2024-09-09 ENCOUNTER — OFFICE VISIT (OUTPATIENT)
Dept: FAMILY MEDICINE CLINIC | Facility: CLINIC | Age: 39
End: 2024-09-09
Payer: COMMERCIAL

## 2024-09-09 VITALS
RESPIRATION RATE: 16 BRPM | OXYGEN SATURATION: 98 % | WEIGHT: 315 LBS | SYSTOLIC BLOOD PRESSURE: 124 MMHG | TEMPERATURE: 98.2 F | DIASTOLIC BLOOD PRESSURE: 84 MMHG | BODY MASS INDEX: 42.66 KG/M2 | HEART RATE: 89 BPM | HEIGHT: 72 IN

## 2024-09-09 DIAGNOSIS — L08.9 INFECTED SEBACEOUS CYST OF SKIN: ICD-10-CM

## 2024-09-09 DIAGNOSIS — Z23 NEED FOR INFLUENZA VACCINATION: Primary | ICD-10-CM

## 2024-09-09 DIAGNOSIS — L72.3 INFECTED SEBACEOUS CYST OF SKIN: ICD-10-CM

## 2024-09-09 PROCEDURE — 99213 OFFICE O/P EST LOW 20 MIN: CPT | Performed by: PHYSICIAN ASSISTANT

## 2024-09-09 PROCEDURE — 90656 IIV3 VACC NO PRSV 0.5 ML IM: CPT

## 2024-09-09 PROCEDURE — 90471 IMMUNIZATION ADMIN: CPT

## 2024-09-09 RX ORDER — CEPHALEXIN 500 MG/1
1000 CAPSULE ORAL EVERY 12 HOURS SCHEDULED
Qty: 40 CAPSULE | Refills: 0 | Status: SHIPPED | OUTPATIENT
Start: 2024-09-09 | End: 2024-09-19

## 2024-09-09 NOTE — PROGRESS NOTES
Assessment/Plan:    Infected sebaceous cyst - warm compresses 3-4 times a day, start keflex 2 tabs twice daily for 10 days, if no improvement in 72 hr return to office    F/u as needed    Subjective:   Chief Complaint   Patient presents with    Mass     Lump on the right side of neck  Increased in size over the weekend. Painful      Patient ID: David Ferguson is a 38 y.o. male.    Patient here with a lump on right side of neck a couple months ago, figured it was a sebaceous cyst and ignored it. Then Friday noticed it was irritating him, by Saturday was a grape, continues to get bigger, more tender, more red and warm. Feels like it is pushing on the muscles in neck. Chills last night. No fever.Tried tylenol with no relief.        The following portions of the patient's history were reviewed and updated as appropriate: allergies, current medications, past family history, past medical history, past social history, past surgical history, and problem list.    Past Medical History:   Diagnosis Date    Allergic      Past Surgical History:   Procedure Laterality Date    KNEE SURGERY      right knee ACL reconstruction 1999     Family History   Problem Relation Age of Onset    Appendicitis Mother     Other Mother         Kidney transplanted    Lupus Mother     Breast cancer Mother     Raynaud syndrome Mother     Kidney failure Mother     Depression Mother     Anxiety disorder Mother     No Known Problems Father     Depression Maternal Grandmother     Diabetes Other     Hearing loss Paternal Grandfather     Depression Brother     Alcohol abuse Neg Hx     Substance Abuse Neg Hx     Mental illness Neg Hx      Social History     Socioeconomic History    Marital status: /Civil Union     Spouse name: Not on file    Number of children: Not on file    Years of education: Not on file    Highest education level: Not on file   Occupational History    Not on file   Tobacco Use    Smoking status: Former    Smokeless tobacco:  Never   Vaping Use    Vaping status: Never Used   Substance and Sexual Activity    Alcohol use: Not Currently     Comment: drinks beer 1-2 times a week     Drug use: No    Sexual activity: Not on file   Other Topics Concern    Not on file   Social History Narrative    Always uses seat belt    Daily caffeine consumption 1 serving a day    Has smoke detectors     Social Determinants of Health     Financial Resource Strain: Not on file   Food Insecurity: Not on file   Transportation Needs: Not on file   Physical Activity: Not on file   Stress: Not on file   Social Connections: Not on file   Intimate Partner Violence: Not on file   Housing Stability: Not on file       Current Outpatient Medications:     fluticasone (FLONASE) 50 mcg/act nasal spray, 1 spray into each nostril daily, Disp: 18.2 mL, Rfl: 1    polyethylene glycol (MIRALAX) 17 g packet, Take 17 g by mouth daily As needed for constipation, Disp: 100 each, Rfl: 2    albuterol (PROVENTIL HFA,VENTOLIN HFA) 90 mcg/act inhaler, Inhale 2 puffs every 6 (six) hours as needed for wheezing or shortness of breath, Disp: 8 g, Rfl: 0    Review of Systems          Objective:    Vitals:    09/09/24 1348   BP: 124/84   Pulse: 89   Resp: 16   Temp: 98.2 °F (36.8 °C)   TempSrc: Temporal   SpO2: 98%   Weight: (!) 167 kg (368 lb)   Height: 6' (1.829 m)        Physical Exam  Constitutional:       Appearance: Normal appearance. He is obese.   HENT:      Head: Normocephalic and atraumatic.   Skin:     Comments: Right side neck with area of erythema, warmth, tenderness, induration about 4 cm x 3 cm, central necrotic center consistent with sebaceous cyst   Neurological:      General: No focal deficit present.      Mental Status: He is alert and oriented to person, place, and time.   Psychiatric:         Mood and Affect: Mood normal.         Behavior: Behavior normal.         Thought Content: Thought content normal.         Judgment: Judgment normal.             Depression Screening  Follow-up Plan: Patient's depression screening was positive with a PHQ-2 score of . Their PHQ-9 score was 10. Patient assessed for underlying major depression. They have no active suicidal ideations. Brief counseling provided and recommend additional follow-up/re-evaluation next office visit.

## 2024-09-12 ENCOUNTER — TELEPHONE (OUTPATIENT)
Age: 39
End: 2024-09-12

## 2024-09-12 NOTE — TELEPHONE ENCOUNTER
Patient called back and says that lump is now larger and spread out though not as raised. Getting pain in ear on that side, hurts to touch that area. Pt did have a fever of 101 yesterday and Tuesday/ Stayed home from work yesterday. Feeling a little better today but taking tylenol to control fever. Please advise.

## 2024-09-12 NOTE — TELEPHONE ENCOUNTER
Patient saw cheng for the lump on his neck was prescribed cephalexin (KEFLEX) 500 mg capsule   He was supposed to reach back out after 72 hours of antibiotic. Patient is stating that lump went down a little but it spreading down to neck muscle following the shoulder.    Please call patient and advise him accordingly.  Thank you!!

## 2024-09-12 NOTE — TELEPHONE ENCOUNTER
Pt called about reporting no fevers but still not feeling good.  Tuesday he had fever at work 100/101 and didn't go to work yesterday.  He had chills hot and cold Wednesday subsides Thurs  morning.  He still feels blah.  The lump was golf ball size now more elongating and traveling down his neck following the neck muscle, got bigger.  Warm transfer to clinical.

## 2024-09-13 NOTE — TELEPHONE ENCOUNTER
Pt returned phone call, did advise that he should go to urgent care after given the symptoms. Pt would like to schedule a Monday appt just in discuss with Tanya. Scheduled appt for Monday at 10am but did strongly advise if the swollen increases, fever comes back and pain gets worse to head to the urgent care over the weekend. Pt understood and had no further questions or concerns at this time.

## 2024-09-16 ENCOUNTER — OFFICE VISIT (OUTPATIENT)
Dept: FAMILY MEDICINE CLINIC | Facility: CLINIC | Age: 39
End: 2024-09-16
Payer: COMMERCIAL

## 2024-09-16 VITALS
SYSTOLIC BLOOD PRESSURE: 138 MMHG | HEIGHT: 72 IN | DIASTOLIC BLOOD PRESSURE: 90 MMHG | TEMPERATURE: 98 F | HEART RATE: 78 BPM | WEIGHT: 315 LBS | BODY MASS INDEX: 42.66 KG/M2 | RESPIRATION RATE: 16 BRPM | OXYGEN SATURATION: 98 %

## 2024-09-16 DIAGNOSIS — L72.3 SEBACEOUS CYST: Primary | ICD-10-CM

## 2024-09-16 PROCEDURE — 99213 OFFICE O/P EST LOW 20 MIN: CPT | Performed by: PHYSICIAN ASSISTANT

## 2024-10-02 ENCOUNTER — CONSULT (OUTPATIENT)
Dept: SURGERY | Facility: CLINIC | Age: 39
End: 2024-10-02
Payer: COMMERCIAL

## 2024-10-02 VITALS
HEIGHT: 72 IN | BODY MASS INDEX: 42.66 KG/M2 | DIASTOLIC BLOOD PRESSURE: 86 MMHG | TEMPERATURE: 97.7 F | WEIGHT: 315 LBS | SYSTOLIC BLOOD PRESSURE: 136 MMHG | HEART RATE: 69 BPM

## 2024-10-02 DIAGNOSIS — L72.3 SEBACEOUS CYST: ICD-10-CM

## 2024-10-02 PROCEDURE — 99204 OFFICE O/P NEW MOD 45 MIN: CPT | Performed by: SURGERY

## 2024-10-02 NOTE — PROGRESS NOTES
Ambulatory Visit  Name: David Ferguson      : 1985      MRN: [id number]  Encounter Provider: Ezra Garay MD  Encounter Date: 10/2/2024   Encounter department: [facility] GENERAL SURGERY [facility]    Assessment & Plan  Sebaceous cyst  38-year-old male with sebaceous cysts to the right posterior neck, left upper back, and left mid back.    Plan:  - Will plan on excision of the mass. Risks and benefits of surgery were reviewed and the patient was amenable. Specific risks reviewed include: post op seroma, hematoma, or recurrence of the mass.  - Will schedule at the patient's earliest convenience.      Orders:    Ambulatory Referral to General Surgery      History of Present Illness     David Ferguson is a 38 y.o. male who presents with sebaceous cyst to his posterior neck and back.  He has had several cysts in his mid back as well as his right posterior neck for many years.  Unfortunately he had an episode several weeks ago where he developed an infected cyst in his right posterior neck that spontaneously drained.  He was worked up and treated expertly by Tanya Landeros PA-C and given some antibiotics.  His pain and swelling has completely resolved.  He denies any fevers, chills, chest pain, shortness of breath.  He is tolerating a diet, moving his bowels normally.    History obtained from : patient and patient's Significant Other  Review of Systems   Constitutional:  Negative for appetite change, chills, diaphoresis and fever.   HENT:  Negative for nosebleeds and trouble swallowing.    Eyes: Negative.    Respiratory:  Negative for cough, shortness of breath and wheezing.    Cardiovascular:  Negative for chest pain, palpitations and leg swelling.   Gastrointestinal:  Negative for abdominal distention, abdominal pain, nausea and vomiting.   Genitourinary:  Negative for difficulty urinating, flank pain and frequency.   Musculoskeletal:  Negative for arthralgias, joint swelling and myalgias.   Skin:   Positive for wound. Negative for pallor and rash.   Neurological:  Negative for dizziness, facial asymmetry and speech difficulty.   Hematological:  Does not bruise/bleed easily.   Psychiatric/Behavioral:  Negative for agitation and confusion.    All other systems reviewed and are negative.    Past Medical History   Past Medical History:   Diagnosis Date    Allergic      Past Surgical History:   Procedure Laterality Date    KNEE SURGERY      right knee ACL reconstruction 1999     Family History   Problem Relation Age of Onset    Appendicitis Mother     Other Mother         Kidney transplanted    Lupus Mother     Breast cancer Mother     Raynaud syndrome Mother     Kidney failure Mother     Depression Mother     Anxiety disorder Mother     No Known Problems Father     Depression Maternal Grandmother     Diabetes Other     Hearing loss Paternal Grandfather     Depression Brother     Alcohol abuse Neg Hx     Substance Abuse Neg Hx     Mental illness Neg Hx      Current Outpatient Medications on File Prior to Visit   Medication Sig Dispense Refill    fluticasone (FLONASE) 50 mcg/act nasal spray 1 spray into each nostril daily 18.2 mL 1    polyethylene glycol (MIRALAX) 17 g packet Take 17 g by mouth daily As needed for constipation 100 each 2     No current facility-administered medications on file prior to visit.     Allergies   Allergen Reactions    Shellfish Allergy - Food Allergy Anaphylaxis     Category: Allergy;     Amoxicillin Hives     Category: Allergy;     Pollen Extract Allergic Rhinitis      Current Outpatient Medications on File Prior to Visit   Medication Sig Dispense Refill    fluticasone (FLONASE) 50 mcg/act nasal spray 1 spray into each nostril daily 18.2 mL 1    polyethylene glycol (MIRALAX) 17 g packet Take 17 g by mouth daily As needed for constipation 100 each 2     No current facility-administered medications on file prior to visit.      Social History     Tobacco Use    Smoking status: Former     Smokeless tobacco: Never   Vaping Use    Vaping status: Never Used   Substance and Sexual Activity    Alcohol use: Not Currently     Comment: drinks beer 1-2 times a week     Drug use: No    Sexual activity: Not on file         Objective     /86 (BP Location: Left arm, Patient Position: Sitting, Cuff Size: Large)   Pulse 69   Temp 97.7 °F (36.5 °C) (Temporal)   Ht 6' (1.829 m)   Wt (!) 168 kg (370 lb 3.2 oz)   BMI 50.21 kg/m²     Physical Exam  Vitals and nursing note reviewed.   Constitutional:       General: He is not in acute distress.     Appearance: Normal appearance. He is not ill-appearing.   HENT:      Head: Normocephalic and atraumatic.      Mouth/Throat:      Mouth: Mucous membranes are moist.      Pharynx: Oropharynx is clear.   Eyes:      Extraocular Movements: Extraocular movements intact.   Neck:      Comments: Right posterior neck along the hairline is a 1 x 1 cm cystic lesion consistent with a sebaceous cyst.  Cardiovascular:      Rate and Rhythm: Normal rate and regular rhythm.   Pulmonary:      Effort: Pulmonary effort is normal. No respiratory distress.      Breath sounds: No stridor. No wheezing.   Abdominal:      General: There is no distension.      Palpations: Abdomen is soft. There is no mass.      Tenderness: There is no abdominal tenderness.      Hernia: No hernia is present.   Musculoskeletal:         General: No swelling or tenderness. Normal range of motion.      Cervical back: Neck supple.   Skin:     General: Skin is warm and dry.      Comments: Left posterior shoulder 1.5 x 1.0 centimeters cystic lesion.  Mid back 2 x 2 cm cystic lesion.  No erythema or induration.   Neurological:      General: No focal deficit present.      Mental Status: He is alert and oriented to person, place, and time. Mental status is at baseline.   Psychiatric:         Mood and Affect: Mood normal.         Behavior: Behavior normal.

## 2024-10-02 NOTE — ASSESSMENT & PLAN NOTE
38-year-old male with sebaceous cysts to the right posterior neck, left upper back, and left mid back.    Plan:  - Will plan on excision of the mass. Risks and benefits of surgery were reviewed and the patient was amenable. Specific risks reviewed include: post op seroma, hematoma, or recurrence of the mass.  - Will schedule at the patient's earliest convenience.      Orders:    Ambulatory Referral to General Surgery

## 2024-10-04 ENCOUNTER — TELEPHONE (OUTPATIENT)
Dept: SURGERY | Facility: CLINIC | Age: 39
End: 2024-10-04

## 2024-10-24 RX ORDER — MULTIVITAMIN
1 TABLET ORAL DAILY
COMMUNITY

## 2024-10-24 NOTE — PRE-PROCEDURE INSTRUCTIONS
Pre-Surgery Instructions:   Medication Instructions    fluticasone (FLONASE) 50 mcg/act nasal spray Take day of surgery.    Multiple Vitamin (multivitamin) tablet Stop taking 7 days prior to surgery.    polyethylene glycol (MIRALAX) 17 g packet Hold day of surgery.   Medication instructions for day surgery reviewed. Please use only a sip of water to take your instructed medications. Avoid all over the counter vitamins, supplements and NSAIDS for one week prior to surgery per anesthesia guidelines. Tylenol is ok to take as needed.     You will receive a call one business day prior to surgery with an arrival time and hospital directions. If your surgery is scheduled on a Monday, the hospital will be calling you on the Friday prior to your surgery. If you have not heard from anyone by 8pm, please call the hospital supervisor through the hospital  at [phone]. ([place] [phone] or [place] [phone]).    Do not eat or drink anything after midnight the night before your surgery, including candy, mints, lifesavers, or chewing gum. Do not drink alcohol 24hrs before your surgery. Try not to smoke at least 24hrs before your surgery.       Follow the pre surgery showering instructions as listed in the “My Surgical Experience Booklet” or otherwise provided by your surgeon's office. Do not use a blade to shave the surgical area 1 week before surgery. It is okay to use a clean electric clippers up to 24 hours before surgery. Do not apply any lotions, creams, including makeup, cologne, deodorant, or perfumes after showering on the day of your surgery. Do not use dry shampoo, hair spray, hair gel, or any type of hair products.     No contact lenses, eye make-up, or artificial eyelashes. Remove nail polish, including gel polish, and any artificial, gel, or acrylic nails if possible. Remove all jewelry including rings and body piercing jewelry.     Wear causal clothing that is easy to take on and off. Consider  your type of surgery.    Keep any valuables, jewelry, piercings at home. Please bring any specially ordered equipment (sling, braces) if indicated.    Arrange for a responsible person to drive you to and from the hospital on the day of your surgery. Please confirm the visitor policy for the day of your procedure when you receive your phone call with an arrival time.     Call the surgeon's office with any new illnesses, exposures, or additional questions prior to surgery.    Please reference your “My Surgical Experience Booklet” for additional information to prepare for your upcoming surgery.

## 2024-10-30 DIAGNOSIS — M79.9 SOFT TISSUE LESION: Primary | ICD-10-CM

## 2024-10-31 ENCOUNTER — APPOINTMENT (OUTPATIENT)
Dept: LAB | Facility: CLINIC | Age: 39
End: 2024-10-31
Payer: COMMERCIAL

## 2024-10-31 DIAGNOSIS — M79.9 SOFT TISSUE LESION: ICD-10-CM

## 2024-10-31 LAB
ALBUMIN SERPL BCG-MCNC: 4.1 G/DL (ref 3.5–5)
ALP SERPL-CCNC: 47 U/L (ref 34–104)
ALT SERPL W P-5'-P-CCNC: 33 U/L (ref 7–52)
ANION GAP SERPL CALCULATED.3IONS-SCNC: 9 MMOL/L (ref 4–13)
AST SERPL W P-5'-P-CCNC: 25 U/L (ref 13–39)
BASOPHILS # BLD AUTO: 0.04 THOUSANDS/ΜL (ref 0–0.1)
BASOPHILS NFR BLD AUTO: 1 % (ref 0–1)
BILIRUB SERPL-MCNC: 0.38 MG/DL (ref 0.2–1)
BUN SERPL-MCNC: 25 MG/DL (ref 5–25)
CALCIUM SERPL-MCNC: 9.3 MG/DL (ref 8.4–10.2)
CHLORIDE SERPL-SCNC: 107 MMOL/L (ref 96–108)
CO2 SERPL-SCNC: 23 MMOL/L (ref 21–32)
CREAT SERPL-MCNC: 0.81 MG/DL (ref 0.6–1.3)
EOSINOPHIL # BLD AUTO: 0.17 THOUSAND/ΜL (ref 0–0.61)
EOSINOPHIL NFR BLD AUTO: 3 % (ref 0–6)
ERYTHROCYTE [DISTWIDTH] IN BLOOD BY AUTOMATED COUNT: 14.2 % (ref 11.6–15.1)
GFR SERPL CREATININE-BSD FRML MDRD: 111 ML/MIN/1.73SQ M
GLUCOSE P FAST SERPL-MCNC: 117 MG/DL (ref 65–99)
HCT VFR BLD AUTO: 48.6 % (ref 36.5–49.3)
HGB BLD-MCNC: 15.8 G/DL (ref 12–17)
IMM GRANULOCYTES # BLD AUTO: 0.02 THOUSAND/UL (ref 0–0.2)
IMM GRANULOCYTES NFR BLD AUTO: 0 % (ref 0–2)
LYMPHOCYTES # BLD AUTO: 1.34 THOUSANDS/ΜL (ref 0.6–4.47)
LYMPHOCYTES NFR BLD AUTO: 21 % (ref 14–44)
MCH RBC QN AUTO: 27.4 PG (ref 26.8–34.3)
MCHC RBC AUTO-ENTMCNC: 32.5 G/DL (ref 31.4–37.4)
MCV RBC AUTO: 84 FL (ref 82–98)
MONOCYTES # BLD AUTO: 0.7 THOUSAND/ΜL (ref 0.17–1.22)
MONOCYTES NFR BLD AUTO: 11 % (ref 4–12)
NEUTROPHILS # BLD AUTO: 4.25 THOUSANDS/ΜL (ref 1.85–7.62)
NEUTS SEG NFR BLD AUTO: 64 % (ref 43–75)
NRBC BLD AUTO-RTO: 0 /100 WBCS
PLATELET # BLD AUTO: 225 THOUSANDS/UL (ref 149–390)
PMV BLD AUTO: 11.4 FL (ref 8.9–12.7)
POTASSIUM SERPL-SCNC: 4.1 MMOL/L (ref 3.5–5.3)
PROT SERPL-MCNC: 7.2 G/DL (ref 6.4–8.4)
RBC # BLD AUTO: 5.76 MILLION/UL (ref 3.88–5.62)
SODIUM SERPL-SCNC: 139 MMOL/L (ref 135–147)
WBC # BLD AUTO: 6.52 THOUSAND/UL (ref 4.31–10.16)

## 2024-10-31 PROCEDURE — 36415 COLL VENOUS BLD VENIPUNCTURE: CPT

## 2024-10-31 PROCEDURE — 80053 COMPREHEN METABOLIC PANEL: CPT

## 2024-10-31 PROCEDURE — 85025 COMPLETE CBC W/AUTO DIFF WBC: CPT

## 2024-11-04 ENCOUNTER — HOSPITAL ENCOUNTER (OUTPATIENT)
Facility: HOSPITAL | Age: 39
Setting detail: OUTPATIENT SURGERY
Discharge: HOME | End: 2024-11-04
Attending: SURGERY | Admitting: SURGERY
Payer: COMMERCIAL

## 2024-11-04 ENCOUNTER — ANESTHESIA EVENT (OUTPATIENT)
Dept: PERIOP | Facility: HOSPITAL | Age: 39
End: 2024-11-04
Payer: COMMERCIAL

## 2024-11-04 ENCOUNTER — ANESTHESIA (OUTPATIENT)
Dept: PERIOP | Facility: HOSPITAL | Age: 39
End: 2024-11-04
Payer: COMMERCIAL

## 2024-11-04 VITALS
SYSTOLIC BLOOD PRESSURE: 140 MMHG | WEIGHT: 315 LBS | RESPIRATION RATE: 22 BRPM | BODY MASS INDEX: 42.66 KG/M2 | TEMPERATURE: 98.3 F | HEART RATE: 74 BPM | DIASTOLIC BLOOD PRESSURE: 68 MMHG | HEIGHT: 72 IN | OXYGEN SATURATION: 95 %

## 2024-11-04 DIAGNOSIS — M79.89 SOFT TISSUE MASS: ICD-10-CM

## 2024-11-04 PROBLEM — G47.33 OSA (OBSTRUCTIVE SLEEP APNEA): Status: ACTIVE | Noted: 2024-11-04

## 2024-11-04 PROCEDURE — 23071 EXC SHOULDER LES SC 3 CM/>: CPT | Performed by: SURGERY

## 2024-11-04 PROCEDURE — 88304 TISSUE EXAM BY PATHOLOGIST: CPT | Performed by: PATHOLOGY

## 2024-11-04 PROCEDURE — 21931 EXC BACK LES SC 3 CM/>: CPT | Performed by: PHYSICIAN ASSISTANT

## 2024-11-04 PROCEDURE — 88305 TISSUE EXAM BY PATHOLOGIST: CPT | Performed by: PATHOLOGY

## 2024-11-04 PROCEDURE — 21931 EXC BACK LES SC 3 CM/>: CPT | Performed by: SURGERY

## 2024-11-04 PROCEDURE — NC001 PR NO CHARGE: Performed by: SURGERY

## 2024-11-04 PROCEDURE — 21552 EXC NECK LES SC 3 CM/>: CPT | Performed by: PHYSICIAN ASSISTANT

## 2024-11-04 PROCEDURE — 21552 EXC NECK LES SC 3 CM/>: CPT | Performed by: SURGERY

## 2024-11-04 PROCEDURE — 23071 EXC SHOULDER LES SC 3 CM/>: CPT | Performed by: PHYSICIAN ASSISTANT

## 2024-11-04 RX ORDER — CEFAZOLIN SODIUM 1 G/50ML
1000 SOLUTION INTRAVENOUS ONCE
Status: COMPLETED | OUTPATIENT
Start: 2024-11-04 | End: 2024-11-04

## 2024-11-04 RX ORDER — ACETAMINOPHEN 325 MG/1
650 TABLET ORAL EVERY 6 HOURS PRN
Status: DISCONTINUED | OUTPATIENT
Start: 2024-11-04 | End: 2024-11-04 | Stop reason: HOSPADM

## 2024-11-04 RX ORDER — ONDANSETRON 2 MG/ML
4 INJECTION INTRAMUSCULAR; INTRAVENOUS EVERY 6 HOURS PRN
Status: DISCONTINUED | OUTPATIENT
Start: 2024-11-04 | End: 2024-11-04 | Stop reason: HOSPADM

## 2024-11-04 RX ORDER — MIDAZOLAM HYDROCHLORIDE 2 MG/2ML
INJECTION, SOLUTION INTRAMUSCULAR; INTRAVENOUS AS NEEDED
Status: DISCONTINUED | OUTPATIENT
Start: 2024-11-04 | End: 2024-11-04

## 2024-11-04 RX ORDER — FENTANYL CITRATE 50 UG/ML
INJECTION, SOLUTION INTRAMUSCULAR; INTRAVENOUS AS NEEDED
Status: DISCONTINUED | OUTPATIENT
Start: 2024-11-04 | End: 2024-11-04

## 2024-11-04 RX ORDER — DEXAMETHASONE SODIUM PHOSPHATE 10 MG/ML
INJECTION, SOLUTION INTRAMUSCULAR; INTRAVENOUS AS NEEDED
Status: DISCONTINUED | OUTPATIENT
Start: 2024-11-04 | End: 2024-11-04

## 2024-11-04 RX ORDER — PROPOFOL 10 MG/ML
INJECTION, EMULSION INTRAVENOUS AS NEEDED
Status: DISCONTINUED | OUTPATIENT
Start: 2024-11-04 | End: 2024-11-04

## 2024-11-04 RX ORDER — CEFAZOLIN SODIUM 2 G/50ML
2000 SOLUTION INTRAVENOUS ONCE
Status: COMPLETED | OUTPATIENT
Start: 2024-11-04 | End: 2024-11-04

## 2024-11-04 RX ORDER — SUCCINYLCHOLINE/SOD CL,ISO/PF 100 MG/5ML
SYRINGE (ML) INTRAVENOUS AS NEEDED
Status: DISCONTINUED | OUTPATIENT
Start: 2024-11-04 | End: 2024-11-04

## 2024-11-04 RX ORDER — ONDANSETRON 2 MG/ML
INJECTION INTRAMUSCULAR; INTRAVENOUS AS NEEDED
Status: DISCONTINUED | OUTPATIENT
Start: 2024-11-04 | End: 2024-11-04

## 2024-11-04 RX ORDER — ONDANSETRON 2 MG/ML
4 INJECTION INTRAMUSCULAR; INTRAVENOUS ONCE AS NEEDED
Status: COMPLETED | OUTPATIENT
Start: 2024-11-04 | End: 2024-11-04

## 2024-11-04 RX ORDER — ACETAMINOPHEN 325 MG/1
650 TABLET ORAL EVERY 6 HOURS PRN
Start: 2024-11-04

## 2024-11-04 RX ORDER — OXYCODONE HYDROCHLORIDE 5 MG/1
5 TABLET ORAL EVERY 4 HOURS PRN
Status: DISCONTINUED | OUTPATIENT
Start: 2024-11-04 | End: 2024-11-04 | Stop reason: HOSPADM

## 2024-11-04 RX ORDER — BUPIVACAINE HYDROCHLORIDE 2.5 MG/ML
INJECTION, SOLUTION EPIDURAL; INFILTRATION; INTRACAUDAL AS NEEDED
Status: DISCONTINUED | OUTPATIENT
Start: 2024-11-04 | End: 2024-11-04 | Stop reason: HOSPADM

## 2024-11-04 RX ORDER — FENTANYL CITRATE/PF 50 MCG/ML
25 SYRINGE (ML) INJECTION
Status: DISCONTINUED | OUTPATIENT
Start: 2024-11-04 | End: 2024-11-04 | Stop reason: HOSPADM

## 2024-11-04 RX ORDER — LIDOCAINE HYDROCHLORIDE 20 MG/ML
INJECTION, SOLUTION EPIDURAL; INFILTRATION; INTRACAUDAL; PERINEURAL AS NEEDED
Status: DISCONTINUED | OUTPATIENT
Start: 2024-11-04 | End: 2024-11-04

## 2024-11-04 RX ORDER — SODIUM CHLORIDE, SODIUM LACTATE, POTASSIUM CHLORIDE, CALCIUM CHLORIDE 600; 310; 30; 20 MG/100ML; MG/100ML; MG/100ML; MG/100ML
50 INJECTION, SOLUTION INTRAVENOUS CONTINUOUS
Status: DISCONTINUED | OUTPATIENT
Start: 2024-11-04 | End: 2024-11-04 | Stop reason: HOSPADM

## 2024-11-04 RX ORDER — SODIUM CHLORIDE, SODIUM LACTATE, POTASSIUM CHLORIDE, CALCIUM CHLORIDE 600; 310; 30; 20 MG/100ML; MG/100ML; MG/100ML; MG/100ML
INJECTION, SOLUTION INTRAVENOUS CONTINUOUS PRN
Status: DISCONTINUED | OUTPATIENT
Start: 2024-11-04 | End: 2024-11-04

## 2024-11-04 RX ORDER — ROCURONIUM BROMIDE 10 MG/ML
INJECTION, SOLUTION INTRAVENOUS AS NEEDED
Status: DISCONTINUED | OUTPATIENT
Start: 2024-11-04 | End: 2024-11-04

## 2024-11-04 RX ADMIN — FENTANYL CITRATE 50 MCG: 50 INJECTION, SOLUTION INTRAMUSCULAR; INTRAVENOUS at 07:31

## 2024-11-04 RX ADMIN — LIDOCAINE HYDROCHLORIDE 100 MG: 20 INJECTION, SOLUTION EPIDURAL; INFILTRATION; INTRACAUDAL; PERINEURAL at 07:31

## 2024-11-04 RX ADMIN — ONDANSETRON 4 MG: 2 INJECTION, SOLUTION INTRAMUSCULAR; INTRAVENOUS at 09:25

## 2024-11-04 RX ADMIN — PROPOFOL 350 MG: 10 INJECTION, EMULSION INTRAVENOUS at 07:31

## 2024-11-04 RX ADMIN — Medication 400 MG: at 07:31

## 2024-11-04 RX ADMIN — MIDAZOLAM 2 MG: 1 INJECTION INTRAMUSCULAR; INTRAVENOUS at 07:25

## 2024-11-04 RX ADMIN — CEFAZOLIN SODIUM 1000 MG: 2 SOLUTION INTRAVENOUS at 07:35

## 2024-11-04 RX ADMIN — CEFAZOLIN SODIUM 2000 MG: 1 SOLUTION INTRAVENOUS at 07:34

## 2024-11-04 RX ADMIN — DEXAMETHASONE SODIUM PHOSPHATE 10 MG: 10 INJECTION, SOLUTION INTRAMUSCULAR; INTRAVENOUS at 07:47

## 2024-11-04 RX ADMIN — FENTANYL CITRATE 50 MCG: 50 INJECTION, SOLUTION INTRAMUSCULAR; INTRAVENOUS at 07:54

## 2024-11-04 RX ADMIN — SODIUM CHLORIDE, SODIUM LACTATE, POTASSIUM CHLORIDE, AND CALCIUM CHLORIDE: .6; .31; .03; .02 INJECTION, SOLUTION INTRAVENOUS at 07:00

## 2024-11-04 RX ADMIN — ROCURONIUM BROMIDE 30 MG: 10 INJECTION, SOLUTION INTRAVENOUS at 07:37

## 2024-11-04 RX ADMIN — ONDANSETRON 4 MG: 2 INJECTION INTRAMUSCULAR; INTRAVENOUS at 07:50

## 2024-11-04 NOTE — ANESTHESIA PREPROCEDURE EVALUATION
Procedure:  EXCISION SOFT TISSUE MASS RT POSTERIOR NECK, LT UPPER BACK AND MID BACK (Neck)  EXCISION  BIOPSY LESION/MASS BACK LEFT UPPER BACK AND MID BACK (Back)    Relevant Problems   NEURO/PSYCH   (+) Current moderate episode of major depressive disorder without prior episode (HCC)   (+) Depression, recurrent (HCC)   (+) Nonintractable headache      PULMONARY   (+) CARLA (obstructive sleep apnea)      Other   (+) Morbid obesity with BMI of 40.0-44.9, adult ([facility])        Physical Exam    Airway    Mallampati score: II  TM Distance: >3 FB  Neck ROM: full     Dental   No notable dental hx     Cardiovascular      Pulmonary      Other Findings        Anesthesia Plan  ASA Score- 2     Anesthesia Type- general with ASA Monitors.         Additional Monitors:     Airway Plan: ETT.           Plan Factors-    Chart reviewed.   Existing labs reviewed. Patient summary reviewed.    Patient is not a current smoker.              Induction- intravenous.    Postoperative Plan- Plan for postoperative opioid use.         Informed Consent- Anesthetic plan and risks discussed with patient.  I personally reviewed this patient with the CRNA. Discussed and agreed on the Anesthesia Plan with the CRNA..

## 2024-11-04 NOTE — ASSESSMENT & PLAN NOTE
Will plan for excision of soft tissue mass x 3 from the right posterior neck, left shoulder, and mid back.  Patient is amenable to risk and benefits, proceed back to the operating room expeditiously.

## 2024-11-04 NOTE — OP NOTE
OPERATIVE REPORT  PATIENT NAME: David Ferguson    :  1985  MRN: [id number]  Pt Location: [facility] OR ROOM 01    SURGERY DATE: 2024    Surgeons and Role:     * Ezra Garay MD - Primary     * Yeimi Hannah PA-C - Assisting    Preop Diagnosis:  Soft tissue mass [M79.89]    Post-Op Diagnosis Codes:     * Soft tissue mass [M79.89]    Procedure(s):  EXCISION SOFT TISSUE MASS RT POSTERIOR NECK. LT UPPER BACK AND MID BACK  EXCISION  BIOPSY LESION/MASS BACK LEFT UPPER BACK AND MID BACK    Specimen(s):  ID Type Source Tests Collected by Time Destination   1 : Right Posterior Neck Tissue Neck TISSUE EXAM Ezra Garay MD 2024    2 : Soft Tissue Mass- Mid Back Tissue Back TISSUE EXAM Ezra Garay MD 2024    3 : Soft Tissue Mass- Left Posterior Shoulder Tissue Back TISSUE EXAM Ezra Garay MD 2024        Estimated Blood Loss:   Minimal    Drains:  * No LDAs found *    Anesthesia Type:   General    Operative Indications:  Soft tissue mass [M79.89]  38-year-old male with painful lesions to his posterior neck and upper back.  After an appropriate workup and a discussion of risks and benefits, he opted to proceed to the operating room for planned excision of the lesions.    Operative Findings:  3 distinct soft tissue lesions.  The right posterior neck at the hairline was 1.5 by 1.5 x 0.5 cm in the subcutaneous fat.    The left posterior shoulder was 1.2 x 0.5 x 1.3 cm in the subcutaneous fat.    The mid back lesion was 4 x 1.5 x 2.5 cm in the subcutaneous fat.      Complications:   None    Procedure and Technique:  The patient was seen in the Holding Room. The risks, benefits, complications, treatment options, and expected outcomes were discussed with the patient. The possibilities of reaction to medication, bleeding, infection, the need for additional procedures, failure to diagnose a condition, and creating a complication were discussed with the patient. The patient  concurred with the proposed plan, giving informed consent.  The site of surgery properly noted/marked. The patient was taken to Operating Room, identified as David Ferguson and staff verified patient name, , procedure, site, and laterality. A Time Out was held and the above information confirmed.    The patient was placed lying prone.  The  back and neck  was prepped and draped in standard fashion.  Local anesthesia was used to anesthetize the skin surrounding a 4 cm lesion in the mid back.  A vertical elliptical incision was made over the lesion.  Sharp and blunt dissection,Using scissors, knife, and cautery, were used to mobilize the mass which was in a subcutaneous location. 5 mm margins were taken. Skin, soft tissue, and the mass, and surrounding fat were taken.     Local anesthesia was used to anesthetize the skin surrounding a 2 cm lesion in the right posterior neck.  A oblique elliptical incision was made over the lesion.  Sharp and blunt dissection,Using scissors, knife, and cautery, were used to mobilize the mass which was in a subcutaneous location. 5 mm margins were taken. Skin, soft tissue, and the mass, and surrounding fat were taken.     Local anesthesia was used to anesthetize the skin surrounding a 2 cm lesion in the left posterior shoulder.  A transverse elliptical incision was made over the lesion.  Sharp and blunt dissection,Using scissors, knife, and cautery, were used to mobilize the mass which was in a subcutaneous location. 5 mm margins were taken. Skin, soft tissue, and the mass, and surrounding fat were taken.     Hemostasis was achieved with cautery. The wound was irrigated.  The wound was closed in multiple layers using 3-0 Vicryl suture for subcutaneous tissue and 4-0 Monocryl subcuticular stitch. The wound was dressed, and the patient was taken to PACU in stable condition.    Sponge, needle, and instrument counts were correct x2.      I was present for the entire procedure., A  qualified resident physician was not available., and A physician assistant was required during the procedure for retraction, tissue handling, dissection and suturing.    Patient Disposition:  PACU  and hemodynamically stable             SIGNATURE: Ezra Garay MD  DATE: November 4, 2024  TIME: 8:33 AM

## 2024-11-04 NOTE — ANESTHESIA POSTPROCEDURE EVALUATION
Post-Op Assessment Note    CV Status:  Stable  Pain Score: 0    Pain management: adequate       Mental Status:  Alert and awake   Hydration Status:  Euvolemic   PONV Controlled:  Controlled   Airway Patency:  Patent     Post Op Vitals Reviewed: Yes    No anethesia notable event occurred.    Staff: CRNA           Last Filed PACU Vitals:  Vitals Value Taken Time   Temp 97.7    Pulse 86 11/04/24 0842   /95 11/04/24 0838   Resp 24 11/04/24 0842   SpO2 98 % 11/04/24 0842   Vitals shown include unfiled device data.    Modified Jose Rafael:  No data recorded

## 2024-11-04 NOTE — ANESTHESIA POSTPROCEDURE EVALUATION
Post-Op Assessment Note      Pain management: adequate       Mental Status:  Alert and awake   Hydration Status:  Euvolemic   PONV Controlled:  None   Airway Patency:  Patent     Post Op Vitals Reviewed: Yes    No anethesia notable event occurred.    Staff: Anesthesiologist           Last Filed PACU Vitals:  Vitals Value Taken Time   Temp 98.3 °F (36.8 °C) 11/04/24 0945   Pulse 74 11/04/24 0949   /68 11/04/24 0945   Resp 22 11/04/24 0949   SpO2 96 % 11/04/24 0949   Vitals shown include unfiled device data.    Modified Jose Rafael:  Activity: 2 (11/4/2024  9:05 AM)  Respiration: 2 (11/4/2024  9:05 AM)  Circulation: 2 (11/4/2024  9:05 AM)  Consciousness: 2 (11/4/2024  9:05 AM)  Oxygen Saturation: 2 (11/4/2024  9:05 AM)  Modified Jose Rafael Score: 10 (11/4/2024  9:05 AM)

## 2024-11-04 NOTE — DISCHARGE INSTR - AVS FIRST PAGE
Post-Operative Care Instructions             Dr. Ezra Garay M.D.    1. General: You will feel pulling sensations around the wound and/or aches and pains around the incisions. This is normal. Even minor surgery is a change in your body and this is your body’s way of reaction to it. If you have had abdominal surgery, it may help to support the incision with a small pillow or blanket for comfort when moving or coughing.    2. Wound care:    Bandage/Dressing - Make sure to remove the bandage in about 48 hours, unless instructed otherwise. You usually don't have to redress the wound after 24-48 hours, unless for comfort. Keep the incision clean and dry. Let air get to it. If the Steri-Strips fall off, just keep the wound clean.     Glue - Leave glue alone, it will fall off on its own, no need for an additional dressings    3. Water: You may shower over the wound, unless there are drain tubes left in place. Do not bathe or use a pool or hot tub until cleared by the physician. You may shower right over the staples, glue or Steri-Strips and rinse wound with soapy water but do not scrub incision pat dry when you are done.    4. Activity: You may go up and down stairs, walk as much as you are comfortable, but walk at least 3 times each day. If you have had abdominal or hernia surgery, do not lift anything heavier than 15 pounds for at least 4 weeks.    Limit overhead activity or activity with outstretched arms that pull at incision site    5. Diet: You may resume a regular diet. If you had a same-day surgery or overnight stay surgery, you may wish to eat lightly for a few days: soups, crackers, and sandwiches. You may resume a regular diet when ready.    6. Medications: Resume all of your previous medications, unless told otherwise by the doctor. Tylenol and ibuoprofen is always fine, unless you are taking any narcotic pain medication containing Tylenol (such as Percocet, Darvocet, Vicodin, or anything containing  acetaminophen). Do not take Tylenol if you're taking these medications. You do not need to take the narcotic pain medications unless you are having significant pain and discomfort.    7. Driving: He will need someone to drive you home on the day of surgery. Do not drive or make any important decisions while on narcotic pain medication or 24 hours and after anesthesia or sedation for surgery. Generally, you may drive when your off all narcotic pain medications, and you can turn in your seat comfortably to check your blind spot.     8. Upset Stomach: You may take Maalox, Tums, or similar items for an upset stomach. If your narcotic pain medication causes an upset stomach, do not take it on an empty stomach. Try taking it with at least some crackers or toast.     9. Constipation: Patients often experienced constipation after surgery. You may take over-the-counter medication for this, such as Metamucil, Senokot, Dulcolax, milk of magnesia, etc. You may take a suppository unless you have had anorectal surgery such as a procedure on your hemorrhoids. If you experience significant nausea or vomiting after abdominal surgery, call the office before trying any of these medications.    10. Call the office: If you are experiencing any of the following, fevers above 101.5°, significant nausea or vomiting, if the wound develops drainage and/or is excessive redness around the wound, or if you have significant diarrhea or other worsening symptoms.    11. Pain: You may be given a prescription for pain. This will be given to the hospital, the day of surgery.    12. Sexual Activity: You may resume sexual activity when you feel ready and comfortable and your incision is sealed and healed without apparent infection risk.    [facility]  Phone: [phone]

## 2024-11-04 NOTE — H&P
H&P - Surgery-General   Name: Daivd Ferguson 38 y.o. male I MRN: [id number]  Unit/Bed#: OR POOL I Date of Admission: 11/4/2024   Date of Service: 11/4/2024 I Hospital Day: 0     Assessment & Plan  CARLA (obstructive sleep apnea)    Soft tissue mass  Will plan for excision of soft tissue mass x 3 from the right posterior neck, left shoulder, and mid back.  Patient is amenable to risk and benefits, proceed back to the operating room expeditiously.    History of Present Illness   David Ferguson is a 38 y.o. male who presents with soft tissue masses.  He has 3 small masses which cause him discomfort and occasionally have opened and drained.  These cause him pain especially the lower mid back lesion.  Denies any fevers, chills, chest pain, or shortness of breath.  Is tolerating a diet moving his bowels normally.    Review of Systems   Constitutional:  Negative for appetite change, chills, diaphoresis and fever.   HENT:  Negative for nosebleeds and trouble swallowing.    Eyes: Negative.    Respiratory:  Negative for cough, shortness of breath and wheezing.    Cardiovascular:  Negative for chest pain, palpitations and leg swelling.   Gastrointestinal:  Negative for abdominal distention, abdominal pain, nausea and vomiting.   Genitourinary:  Negative for difficulty urinating, flank pain and frequency.   Musculoskeletal:  Negative for arthralgias, joint swelling and myalgias.   Skin:  Negative for pallor and rash.   Neurological:  Negative for dizziness, facial asymmetry and speech difficulty.   Hematological:  Does not bruise/bleed easily.   Psychiatric/Behavioral:  Negative for agitation and confusion.    All other systems reviewed and are negative.    I have reviewed the patient's PMH, PSH, Social History, Family History, Meds, and Allergies  Historical Information   Past Medical History:   Diagnosis Date    Allergic     Fatty liver      Past Surgical History:   Procedure Laterality Date    KNEE SURGERY Right      right knee ACL reconstruction     MRI/MRA HEAD (HISTORICAL)       Social History     Tobacco Use    Smoking status: Former    Smokeless tobacco: Never    Tobacco comments:     Quit smoking    Vaping Use    Vaping status: Never Used   Substance and Sexual Activity    Alcohol use: Not Currently     Comment: drinks beer 1-2 times a week     Drug use: No    Sexual activity: Not on file     E-Cigarette/Vaping    E-Cigarette Use Never User      E-Cigarette/Vaping Substances    Nicotine No     THC No     CBD No     Flavoring No     Other No     Unknown No      Family History   Problem Relation Age of Onset    Appendicitis Mother     Other Mother         Kidney transplanted    Lupus Mother     Breast cancer Mother     Raynaud syndrome Mother     Kidney failure Mother     Depression Mother     Anxiety disorder Mother     No Known Problems Father     Depression Maternal Grandmother     Diabetes Other     Hearing loss Paternal Grandfather     Depression Brother     Alcohol abuse Neg Hx     Substance Abuse Neg Hx     Mental illness Neg Hx      Social History     Tobacco Use    Smoking status: Former    Smokeless tobacco: Never    Tobacco comments:     Quit smoking    Vaping Use    Vaping status: Never Used   Substance and Sexual Activity    Alcohol use: Not Currently     Comment: drinks beer 1-2 times a week     Drug use: No    Sexual activity: Not on file     No current facility-administered medications for this encounter.  Prior to Admission Medications   Prescriptions Last Dose Informant Patient Reported? Taking?   Multiple Vitamin (multivitamin) tablet Past Month  Yes Yes   Sig: Take 1 tablet by mouth daily   fluticasone (FLONASE) 50 mcg/act nasal spray 11/3/2024 at 0900  No Yes   Si spray into each nostril daily   polyethylene glycol (MIRALAX) 17 g packet 11/3/2024 at 1500  No Yes   Sig: Take 17 g by mouth daily As needed for constipation      Facility-Administered Medications: None     Shellfish allergy -  "food allergy, Amoxicillin, Cephalosporins, and Pollen extract    Objective :  Temp:  [97.1 °F (36.2 °C)] 97.1 °F (36.2 °C)  HR:  [74] 74  BP: (135)/(69) 135/69  Resp:  [18] 18  SpO2:  [96 %] 96 %  O2 Device: None (Room air)      Physical Exam  Vitals and nursing note reviewed.   Constitutional:       General: He is not in acute distress.     Appearance: Normal appearance. He is not ill-appearing.   HENT:      Head: Normocephalic and atraumatic.      Mouth/Throat:      Mouth: Mucous membranes are moist.      Pharynx: Oropharynx is clear.   Eyes:      Extraocular Movements: Extraocular movements intact.   Cardiovascular:      Rate and Rhythm: Normal rate and regular rhythm.   Pulmonary:      Effort: Pulmonary effort is normal. No respiratory distress.      Breath sounds: No stridor. No wheezing.   Abdominal:      General: There is no distension.      Palpations: Abdomen is soft. There is no mass.      Tenderness: There is no abdominal tenderness.      Hernia: No hernia is present.   Musculoskeletal:         General: No swelling or tenderness. Normal range of motion.      Cervical back: Neck supple.   Skin:     General: Skin is warm and dry.      Comments: Fluctuant soft tissue masses to the right posterior neck, left shoulder, and left mid back.   Neurological:      General: No focal deficit present.      Mental Status: He is alert and oriented to person, place, and time. Mental status is at baseline.   Psychiatric:         Mood and Affect: Mood normal.         Behavior: Behavior normal.         Lab Results: I have reviewed the following results:  No results for input(s): \"WBC\", \"HGB\", \"HCT\", \"PLT\", \"BANDSPCT\", \"SODIUM\", \"K\", \"CL\", \"CO2\", \"BUN\", \"CREATININE\", \"GLUC\", \"CAIONIZED\", \"MG\", \"PHOS\", \"AST\", \"ALT\", \"ALB\", \"TBILI\", \"DBILI\", \"ALKPHOS\", \"PTT\", \"INR\", \"HSTNI0\", \"HSTNI2\", \"BNP\", \"LACTICACID\" in the last 72 hours.    Imaging Results Review: No pertinent imaging studies reviewed.  Other Study Results Review: No " additional pertinent studies reviewed.    VTE Pharmacologic Prophylaxis: Sequential compression device (Venodyne)   VTE Mechanical Prophylaxis: sequential compression device

## 2024-11-07 ENCOUNTER — TELEPHONE (OUTPATIENT)
Age: 39
End: 2024-11-07

## 2024-11-07 ENCOUNTER — TELEPHONE (OUTPATIENT)
Dept: SURGERY | Facility: CLINIC | Age: 39
End: 2024-11-07

## 2024-11-07 NOTE — TELEPHONE ENCOUNTER
Patient of Dr Garay s/p EXCISION SOFT TISSUE MASS RT POSTERIOR NECK, LT UPPER BACK AND MID BACK (Neck)       EXCISION  BIOPSY LESION/MASS BACK LEFT UPPER BACK AND MID BACK on Monday 11/4.    Patient called today stating that his midback incision is causing him significant discomfort.  He feels that the area is more reddened and warm, and has had intermittent drainage which he notes has been cloudier than it was intitially.  He was unsure if the area opened or if the glue came off. States that he first noticed Tuesday evening.    Patient is at work currently so he is unable to take his temperature but he thinks he may have a fever. He also stated that he is very sore and may have overdone things while working his physical job.    Patient submitting images for Dr Garay to review. Unsure if he needs to be seen or if he should have antibiotics called in.    Uses    [facility] [address] [place] [address] [address] [place] [address] [address] [address]  Phone: [phone]  Fax: [phone]     Please advise  #[phone]

## 2024-11-07 NOTE — TELEPHONE ENCOUNTER
LM for patient that incisions look absolutely fine. He did say to take it easy on working for the next few days and that should help.

## 2024-11-08 PROCEDURE — 88305 TISSUE EXAM BY PATHOLOGIST: CPT | Performed by: PATHOLOGY

## 2024-11-08 PROCEDURE — 88304 TISSUE EXAM BY PATHOLOGIST: CPT | Performed by: PATHOLOGY

## 2024-11-13 ENCOUNTER — OFFICE VISIT (OUTPATIENT)
Dept: SURGERY | Facility: CLINIC | Age: 39
End: 2024-11-13

## 2024-11-13 VITALS
TEMPERATURE: 98.4 F | HEIGHT: 72 IN | DIASTOLIC BLOOD PRESSURE: 80 MMHG | SYSTOLIC BLOOD PRESSURE: 143 MMHG | BODY MASS INDEX: 42.66 KG/M2 | WEIGHT: 315 LBS | HEART RATE: 82 BPM

## 2024-11-13 DIAGNOSIS — L72.3 SEBACEOUS CYST: Primary | ICD-10-CM

## 2024-11-13 PROCEDURE — 99024 POSTOP FOLLOW-UP VISIT: CPT | Performed by: SURGERY

## 2024-11-13 NOTE — ASSESSMENT & PLAN NOTE
38-year-old male status post excision of 3 cystic lesions from his back and neck.    Plan:  - The patient's pathology was reviewed, and understanding was acknowledged.  - The patient may return to all normal activities at this time.   - I reiterated the lifting restriction of 20 lb until 4 weeks postoperatively, and advised that there are no restrictions from a dietary perspective   - The patient may return to clinic as needed, and can call with any questions should they arise.

## 2024-11-13 NOTE — PROGRESS NOTES
Ambulatory Visit  Name: David Ferguson      : 1985      MRN: [id number]  Encounter Provider: Ezra Garay MD  Encounter Date: 2024   Encounter department: [facility]    Assessment & Plan  Sebaceous cyst  38-year-old male status post excision of 3 cystic lesions from his back and neck.    Plan:  - The patient's pathology was reviewed, and understanding was acknowledged.  - The patient may return to all normal activities at this time.   - I reiterated the lifting restriction of 20 lb until 4 weeks postoperatively, and advised that there are no restrictions from a dietary perspective   - The patient may return to clinic as needed, and can call with any questions should they arise.             History of Present Illness     David Ferguson is a 38 y.o. male who presents status post excision of upper back and right neck cystic lesions on 2024, here for follow-up.  Overall he is doing fairly well without significant complaints.  Originally had some opening in his larger back incision which is subsequently closed.  Has some itching in that region.  Denies any fevers, chills, chest pain, shortness of breath.  Pathology was consistent with sebaceous cyst.    History obtained from : patient  Review of Systems   Constitutional:  Negative for appetite change, chills, diaphoresis and fever.   HENT:  Negative for nosebleeds and trouble swallowing.    Eyes: Negative.    Respiratory:  Negative for cough, shortness of breath and wheezing.    Cardiovascular:  Negative for chest pain, palpitations and leg swelling.   Gastrointestinal:  Negative for abdominal distention, abdominal pain, nausea and vomiting.   Genitourinary:  Negative for difficulty urinating, flank pain and frequency.   Musculoskeletal:  Negative for arthralgias, joint swelling and myalgias.   Skin:  Negative for pallor and rash.   Neurological:  Negative for dizziness, facial asymmetry and speech difficulty.    Hematological:  Does not bruise/bleed easily.   Psychiatric/Behavioral:  Negative for agitation and confusion.    All other systems reviewed and are negative.    Past Medical History   Past Medical History:   Diagnosis Date    Allergic     Fatty liver      Past Surgical History:   Procedure Laterality Date    KNEE SURGERY Right     right knee ACL reconstruction 1999    MRI/MRA HEAD (HISTORICAL)      VA BIOPSY SOFT TISSUE NECK/THORAX N/A 11/4/2024    Procedure: EXCISION SOFT TISSUE MASS RT POSTERIOR NECK, LT UPPER BACK AND MID BACK;  Surgeon: Ezra Garay MD;  Location: [facility] MAIN OR;  Service: General    VA EXCISION TUMOR SOFT TISSUE BACK/FLANK SUBQ <3CM N/A 11/4/2024    Procedure: EXCISION  BIOPSY LESION/MASS BACK LEFT UPPER BACK AND MID BACK;  Surgeon: Ezra Garay MD;  Location:  MAIN OR;  Service: General     Family History   Problem Relation Age of Onset    Appendicitis Mother     Other Mother         Kidney transplanted    Lupus Mother     Breast cancer Mother     Raynaud syndrome Mother     Kidney failure Mother     Depression Mother     Anxiety disorder Mother     No Known Problems Father     Depression Maternal Grandmother     Diabetes Other     Hearing loss Paternal Grandfather     Depression Brother     Alcohol abuse Neg Hx     Substance Abuse Neg Hx     Mental illness Neg Hx      Current Outpatient Medications on File Prior to Visit   Medication Sig Dispense Refill    acetaminophen (TYLENOL) 325 mg tablet Take 2 tablets (650 mg total) by mouth every 6 (six) hours as needed for mild pain or moderate pain      fluticasone (FLONASE) 50 mcg/act nasal spray 1 spray into each nostril daily 18.2 mL 1    Multiple Vitamin (multivitamin) tablet Take 1 tablet by mouth daily      polyethylene glycol (MIRALAX) 17 g packet Take 17 g by mouth daily As needed for constipation 100 each 2     No current facility-administered medications on file prior to visit.     Allergies   Allergen Reactions    Shellfish Allergy  - Food Allergy Anaphylaxis     Category: Allergy;     Amoxicillin Hives     Category: Allergy;     Cephalosporins Rash    Pollen Extract Allergic Rhinitis      Current Outpatient Medications on File Prior to Visit   Medication Sig Dispense Refill    acetaminophen (TYLENOL) 325 mg tablet Take 2 tablets (650 mg total) by mouth every 6 (six) hours as needed for mild pain or moderate pain      fluticasone (FLONASE) 50 mcg/act nasal spray 1 spray into each nostril daily 18.2 mL 1    Multiple Vitamin (multivitamin) tablet Take 1 tablet by mouth daily      polyethylene glycol (MIRALAX) 17 g packet Take 17 g by mouth daily As needed for constipation 100 each 2     No current facility-administered medications on file prior to visit.      Social History     Tobacco Use    Smoking status: Former    Smokeless tobacco: Never    Tobacco comments:     Quit smoking 2010   Vaping Use    Vaping status: Never Used   Substance and Sexual Activity    Alcohol use: Not Currently     Comment: drinks beer 1-2 times a week     Drug use: No    Sexual activity: Not on file         Objective     /80 (BP Location: Left arm, Patient Position: Sitting, Cuff Size: Large)   Pulse 82   Temp 98.4 °F (36.9 °C) (Temporal)   Ht 6' (1.829 m)   Wt (!) 171 kg (377 lb 9.6 oz)   BMI 51.21 kg/m²     Physical Exam  Vitals and nursing note reviewed.   Constitutional:       General: He is not in acute distress.     Appearance: Normal appearance. He is not ill-appearing.   HENT:      Head: Normocephalic and atraumatic.      Mouth/Throat:      Mouth: Mucous membranes are moist.      Pharynx: Oropharynx is clear.   Eyes:      Extraocular Movements: Extraocular movements intact.   Cardiovascular:      Rate and Rhythm: Normal rate and regular rhythm.   Pulmonary:      Effort: Pulmonary effort is normal. No respiratory distress.      Breath sounds: No stridor. No wheezing.   Abdominal:      General: There is no distension.      Palpations: Abdomen is soft.  There is no mass.      Tenderness: There is no abdominal tenderness.      Hernia: No hernia is present.   Musculoskeletal:         General: No swelling or tenderness. Normal range of motion.      Cervical back: Neck supple.   Skin:     General: Skin is warm and dry.      Comments: Well-healed incision x 3.   Neurological:      General: No focal deficit present.      Mental Status: He is alert and oriented to person, place, and time. Mental status is at baseline.   Psychiatric:         Mood and Affect: Mood normal.         Behavior: Behavior normal.

## 2024-11-15 ENCOUNTER — TELEPHONE (OUTPATIENT)
Dept: SURGERY | Facility: CLINIC | Age: 39
End: 2024-11-15

## 2024-11-15 ENCOUNTER — TELEPHONE (OUTPATIENT)
Age: 39
End: 2024-11-15

## 2024-11-15 NOTE — TELEPHONE ENCOUNTER
Called patient to let him know that his incision was fine and to cover it with dry gauze until totally healed.

## 2024-11-15 NOTE — TELEPHONE ENCOUNTER
"Pt of Dr. Garay s/p multiple cyst surgery 11/4/24-  Pt calling in with concern related to Middle back incision drainage since yesterday.    Yesterday was laying on his back and believes it aggravated the area and a clear/pink fluid started leaking, saturating his shirt. Pt states the bottom of the incision has a small opening where a \"steady drip of fluid is coming out.\"  Pt states today his shirt is saturated again with the light pink fluid.   Denies fever, body aches, pus.     Advised pt to keep the area clean, dry, cover with bandage. Monitor for s/s of infection.   Upload photo to [email or web address] by responding to my message. Pt agreeable to plan.     Next available appointment is not for 1 week.   Pt concerned about future infection due to opening.     Please advise.   Call back # [phone]  "

## 2025-01-14 ENCOUNTER — RESULTS FOLLOW-UP (OUTPATIENT)
Dept: FAMILY MEDICINE CLINIC | Facility: CLINIC | Age: 40
End: 2025-01-14

## 2025-01-14 ENCOUNTER — HOSPITAL ENCOUNTER (OUTPATIENT)
Dept: CT IMAGING | Facility: HOSPITAL | Age: 40
Discharge: HOME | End: 2025-01-14
Payer: COMMERCIAL

## 2025-01-14 ENCOUNTER — OFFICE VISIT (OUTPATIENT)
Dept: FAMILY MEDICINE CLINIC | Facility: CLINIC | Age: 40
End: 2025-01-14
Payer: COMMERCIAL

## 2025-01-14 VITALS
BODY MASS INDEX: 42.66 KG/M2 | HEART RATE: 70 BPM | RESPIRATION RATE: 16 BRPM | WEIGHT: 315 LBS | SYSTOLIC BLOOD PRESSURE: 132 MMHG | TEMPERATURE: 97.8 F | OXYGEN SATURATION: 95 % | HEIGHT: 72 IN | DIASTOLIC BLOOD PRESSURE: 86 MMHG

## 2025-01-14 DIAGNOSIS — R19.4 RECENT CHANGE IN FREQUENCY OF BOWEL MOVEMENTS: ICD-10-CM

## 2025-01-14 DIAGNOSIS — R23.3 EASY BRUISING: ICD-10-CM

## 2025-01-14 DIAGNOSIS — F32.2 SEVERE MAJOR DEPRESSIVE DISORDER (HCC): Primary | ICD-10-CM

## 2025-01-14 DIAGNOSIS — R10.12 LUQ PAIN: ICD-10-CM

## 2025-01-14 PROCEDURE — 99214 OFFICE O/P EST MOD 30 MIN: CPT | Performed by: PHYSICIAN ASSISTANT

## 2025-01-14 PROCEDURE — 74176 CT ABD & PELVIS W/O CONTRAST: CPT

## 2025-01-14 RX ADMIN — IOHEXOL 50 ML: 240 INJECTION, SOLUTION INTRATHECAL; INTRAVASCULAR; INTRAVENOUS; ORAL at 14:45

## 2025-01-14 NOTE — PROGRESS NOTES
Assessment/Plan:    LUQ - CT abd/pelvis stat, labs. Will most likely need GI follow up, saw them Jan 2024 for abdominal pain, fatty liver, was to follow up     Slow healing skin - will check A1C    Bruising - check CBC    Weight gain - TSH    F/u pending labs/CT        Subjective:   Chief Complaint   Patient presents with    Abdominal Pain     Pain on left side for the last week and a half   Hurts when coughing, sneezing, lifting. Also hurts with pressure   Did have stomach virus over dong but symptoms resolved    Skin Problem     Had cyst removed from back in November  Area is tender and itchy still      Patient ID: David Ferguson is a 39 y.o. male.    Patient had a week and a half of LUQ pain. Followed a stomach virus on NYE, diarrhea for 48 hrs. NO vomiting. Entire family had stomach virus prior to him. Now past few days not moving his bowels. Took laxative and still not moving bowels completely. Feeling bloated.  Bowels are light in color, light yellow, and loose. No blood.  Decrease in appetite. Feels full quick. Stomach very noisy.     Has been having slow healing or hand wounds and bruising. Just note feeling himself for sometime.  Gaining weight even though he feels he is eating what everyone else in the house is eating. Fatigued.         The following portions of the patient's history were reviewed and updated as appropriate: allergies, current medications, past family history, past medical history, past social history, past surgical history, and problem list.    Past Medical History:   Diagnosis Date    Allergic     Fatty liver      Past Surgical History:   Procedure Laterality Date    KNEE SURGERY Right     right knee ACL reconstruction 1999    MRI/MRA HEAD (HISTORICAL)      FL BIOPSY SOFT TISSUE NECK/THORAX N/A 11/4/2024    Procedure: EXCISION SOFT TISSUE MASS RT POSTERIOR NECK, LT UPPER BACK AND MID BACK;  Surgeon: Ezra Garay MD;  Location:  MAIN OR;  Service: General    FL EXCISION  TUMOR SOFT TISSUE BACK/FLANK SUBQ <3CM N/A 11/4/2024    Procedure: EXCISION  BIOPSY LESION/MASS BACK LEFT UPPER BACK AND MID BACK;  Surgeon: Ezra Garay MD;  Location:  MAIN OR;  Service: General     Family History   Problem Relation Age of Onset    Appendicitis Mother     Other Mother         Kidney transplanted    Lupus Mother     Breast cancer Mother     Raynaud syndrome Mother     Kidney failure Mother     Depression Mother     Anxiety disorder Mother     No Known Problems Father     Depression Maternal Grandmother     Diabetes Other     Hearing loss Paternal Grandfather     Depression Brother     Alcohol abuse Neg Hx     Substance Abuse Neg Hx     Mental illness Neg Hx      Social History     Socioeconomic History    Marital status: /Civil Union     Spouse name: Not on file    Number of children: Not on file    Years of education: Not on file    Highest education level: Not on file   Occupational History    Not on file   Tobacco Use    Smoking status: Former    Smokeless tobacco: Never    Tobacco comments:     Quit smoking 2010   Vaping Use    Vaping status: Never Used   Substance and Sexual Activity    Alcohol use: Not Currently     Comment: drinks beer 1-2 times a week     Drug use: No    Sexual activity: Not on file   Other Topics Concern    Not on file   Social History Narrative    Always uses seat belt    Daily caffeine consumption 1 serving a day    Has smoke detectors     Social Drivers of Health     Financial Resource Strain: Not on file   Food Insecurity: Not on file   Transportation Needs: Not on file   Physical Activity: Not on file   Stress: Not on file   Social Connections: Not on file   Intimate Partner Violence: Not on file   Housing Stability: Not on file       Current Outpatient Medications:     acetaminophen (TYLENOL) 325 mg tablet, Take 2 tablets (650 mg total) by mouth every 6 (six) hours as needed for mild pain or moderate pain, Disp: , Rfl:     fluticasone (FLONASE) 50 mcg/act  nasal spray, 1 spray into each nostril daily, Disp: 18.2 mL, Rfl: 1    Multiple Vitamin (multivitamin) tablet, Take 1 tablet by mouth daily, Disp: , Rfl:     polyethylene glycol (MIRALAX) 17 g packet, Take 17 g by mouth daily As needed for constipation, Disp: 100 each, Rfl: 2    Review of Systems          Objective:    Vitals:    01/14/25 1102   BP: 132/86   Pulse: 70   Resp: 16   Temp: 97.8 °F (36.6 °C)   TempSrc: Temporal   SpO2: 95%   Weight: (!) 172 kg (380 lb)   Height: 6' (1.829 m)        Physical Exam  Constitutional:       Appearance: Normal appearance. He is well-developed. He is obese.   HENT:      Head: Normocephalic and atraumatic.   Cardiovascular:      Rate and Rhythm: Normal rate and regular rhythm.      Pulses: Normal pulses.      Heart sounds: Normal heart sounds.   Pulmonary:      Effort: Pulmonary effort is normal.      Breath sounds: Normal breath sounds.   Abdominal:      General: Abdomen is protuberant. Bowel sounds are normal.      Palpations: Abdomen is soft.      Tenderness: There is abdominal tenderness in the left upper quadrant. There is no guarding or rebound.      Hernia: No hernia is present.   Musculoskeletal:         General: Normal range of motion.      Cervical back: Normal range of motion and neck supple.   Skin:     General: Skin is warm.   Neurological:      General: No focal deficit present.      Mental Status: He is alert and oriented to person, place, and time.   Psychiatric:         Mood and Affect: Mood normal.         Behavior: Behavior normal.         Thought Content: Thought content normal.         Judgment: Judgment normal.

## 2025-01-16 ENCOUNTER — APPOINTMENT (OUTPATIENT)
Dept: LAB | Facility: CLINIC | Age: 40
End: 2025-01-16
Payer: COMMERCIAL

## 2025-01-16 DIAGNOSIS — R10.12 LUQ PAIN: ICD-10-CM

## 2025-01-16 DIAGNOSIS — R23.3 EASY BRUISING: ICD-10-CM

## 2025-01-16 DIAGNOSIS — R19.4 RECENT CHANGE IN FREQUENCY OF BOWEL MOVEMENTS: ICD-10-CM

## 2025-01-16 LAB
BASOPHILS # BLD AUTO: 0.04 THOUSANDS/ΜL (ref 0–0.1)
BASOPHILS NFR BLD AUTO: 1 % (ref 0–1)
EOSINOPHIL # BLD AUTO: 0.08 THOUSAND/ΜL (ref 0–0.61)
EOSINOPHIL NFR BLD AUTO: 1 % (ref 0–6)
ERYTHROCYTE [DISTWIDTH] IN BLOOD BY AUTOMATED COUNT: 14.1 % (ref 11.6–15.1)
EST. AVERAGE GLUCOSE BLD GHB EST-MCNC: 131 MG/DL
HBA1C MFR BLD: 6.2 %
HCT VFR BLD AUTO: 49.2 % (ref 36.5–49.3)
HGB BLD-MCNC: 16.2 G/DL (ref 12–17)
IMM GRANULOCYTES # BLD AUTO: 0.03 THOUSAND/UL (ref 0–0.2)
IMM GRANULOCYTES NFR BLD AUTO: 1 % (ref 0–2)
LYMPHOCYTES # BLD AUTO: 1.37 THOUSANDS/ΜL (ref 0.6–4.47)
LYMPHOCYTES NFR BLD AUTO: 21 % (ref 14–44)
MCH RBC QN AUTO: 27.5 PG (ref 26.8–34.3)
MCHC RBC AUTO-ENTMCNC: 32.9 G/DL (ref 31.4–37.4)
MCV RBC AUTO: 84 FL (ref 82–98)
MONOCYTES # BLD AUTO: 0.76 THOUSAND/ΜL (ref 0.17–1.22)
MONOCYTES NFR BLD AUTO: 12 % (ref 4–12)
NEUTROPHILS # BLD AUTO: 4.12 THOUSANDS/ΜL (ref 1.85–7.62)
NEUTS SEG NFR BLD AUTO: 64 % (ref 43–75)
NRBC BLD AUTO-RTO: 0 /100 WBCS
PLATELET # BLD AUTO: 226 THOUSANDS/UL (ref 149–390)
PMV BLD AUTO: 10.4 FL (ref 8.9–12.7)
RBC # BLD AUTO: 5.89 MILLION/UL (ref 3.88–5.62)
TSH SERPL DL<=0.05 MIU/L-ACNC: 2.93 UIU/ML (ref 0.45–4.5)
WBC # BLD AUTO: 6.4 THOUSAND/UL (ref 4.31–10.16)

## 2025-01-16 PROCEDURE — 84443 ASSAY THYROID STIM HORMONE: CPT

## 2025-01-16 PROCEDURE — 80053 COMPREHEN METABOLIC PANEL: CPT

## 2025-01-16 PROCEDURE — 36415 COLL VENOUS BLD VENIPUNCTURE: CPT

## 2025-01-16 PROCEDURE — 85025 COMPLETE CBC W/AUTO DIFF WBC: CPT

## 2025-01-16 PROCEDURE — 83690 ASSAY OF LIPASE: CPT

## 2025-01-16 PROCEDURE — 83036 HEMOGLOBIN GLYCOSYLATED A1C: CPT

## 2025-01-17 LAB
ALBUMIN SERPL BCG-MCNC: 4.3 G/DL (ref 3.5–5)
ALP SERPL-CCNC: 34 U/L (ref 34–104)
ALT SERPL W P-5'-P-CCNC: 33 U/L (ref 7–52)
ANION GAP SERPL CALCULATED.3IONS-SCNC: 7 MMOL/L (ref 4–13)
AST SERPL W P-5'-P-CCNC: 21 U/L (ref 13–39)
BILIRUB SERPL-MCNC: 0.46 MG/DL (ref 0.2–1)
BUN SERPL-MCNC: 21 MG/DL (ref 5–25)
CALCIUM SERPL-MCNC: 8.2 MG/DL (ref 8.4–10.2)
CHLORIDE SERPL-SCNC: 105 MMOL/L (ref 96–108)
CO2 SERPL-SCNC: 26 MMOL/L (ref 21–32)
CREAT SERPL-MCNC: 0.83 MG/DL (ref 0.6–1.3)
GFR SERPL CREATININE-BSD FRML MDRD: 110 ML/MIN/1.73SQ M
GLUCOSE P FAST SERPL-MCNC: 102 MG/DL (ref 65–99)
LIPASE SERPL-CCNC: 50 U/L (ref 11–82)
POTASSIUM SERPL-SCNC: 4.5 MMOL/L (ref 3.5–5.3)
PROT SERPL-MCNC: 7.4 G/DL (ref 6.4–8.4)
SODIUM SERPL-SCNC: 138 MMOL/L (ref 135–147)

## 2025-01-28 ENCOUNTER — OFFICE VISIT (OUTPATIENT)
Dept: FAMILY MEDICINE CLINIC | Facility: CLINIC | Age: 40
End: 2025-01-28
Payer: COMMERCIAL

## 2025-01-28 VITALS
BODY MASS INDEX: 42.66 KG/M2 | DIASTOLIC BLOOD PRESSURE: 88 MMHG | WEIGHT: 315 LBS | RESPIRATION RATE: 16 BRPM | TEMPERATURE: 97.8 F | HEIGHT: 72 IN | SYSTOLIC BLOOD PRESSURE: 136 MMHG | OXYGEN SATURATION: 98 % | HEART RATE: 78 BPM

## 2025-01-28 DIAGNOSIS — K76.0 FATTY LIVER: ICD-10-CM

## 2025-01-28 DIAGNOSIS — R73.03 PRE-DIABETES: ICD-10-CM

## 2025-01-28 DIAGNOSIS — E66.01 MORBID OBESITY WITH BMI OF 50.0-59.9, ADULT (HCC): Primary | ICD-10-CM

## 2025-01-28 DIAGNOSIS — J01.00 ACUTE NON-RECURRENT MAXILLARY SINUSITIS: ICD-10-CM

## 2025-01-28 DIAGNOSIS — K21.9 GASTROESOPHAGEAL REFLUX DISEASE WITHOUT ESOPHAGITIS: ICD-10-CM

## 2025-01-28 PROCEDURE — 99214 OFFICE O/P EST MOD 30 MIN: CPT | Performed by: PHYSICIAN ASSISTANT

## 2025-01-28 RX ORDER — TIRZEPATIDE 2.5 MG/.5ML
2.5 INJECTION, SOLUTION SUBCUTANEOUS WEEKLY
Qty: 2 ML | Refills: 0 | Status: SHIPPED | OUTPATIENT
Start: 2025-01-28 | End: 2025-02-25

## 2025-01-28 RX ORDER — AZITHROMYCIN 250 MG/1
TABLET, FILM COATED ORAL
Qty: 6 TABLET | Refills: 0 | Status: SHIPPED | OUTPATIENT
Start: 2025-01-28 | End: 2025-02-02

## 2025-01-28 RX ORDER — TIRZEPATIDE 2.5 MG/.5ML
2.5 INJECTION, SOLUTION SUBCUTANEOUS WEEKLY
Qty: 2 ML | Refills: 0 | Status: SHIPPED | OUTPATIENT
Start: 2025-01-28 | End: 2025-01-28

## 2025-01-28 NOTE — PROGRESS NOTES
Assessment/Plan:    Prediabetic - A1C 6.2%, has already begun diet changes, lost 5 lbs, trial zepbound to help reduce sugars and weight  Fatty liver - noted on CT, trial zepbound  Morbid obesity BMI 50.8 - zepbound as above  GERD - improved with change in diet  Fatigue- most likely due to sleep apnea, consider sleep study  Sinusitis - zpack, fluids, rest    F/u as needed  F/u 3 months for zepbound    Prior Authorization Clinical Questions for Weight Management Pharmacotherapy    1. Does the patient have a contrainidcation to medication prescribed for weight management?: No  2. Does the patient have a diagnosis of obesity, confirmed by a BMI greater than or equal to 30 kg/m^2?: Yes  3. Does the patient have a BMI of greater than or equal to 27 kg/m^2 with at least one weight-related comorbidity/risk factor/complication (e.g. diabetes, dyslipidemia, coronary artery disease)?: Yes  4. Weight-related co-morbidities/risk factors: prediabetes, dyslipidemia, metabolic dysfunction-associated steatotic liver disease (MASLD), GERD  5. Has the patient been on a weight loss regimen of low-calorie diet, increased physical activity, and lifestyle modifications for a minimum of 6 months?: No  6. Has the patient completed a comprehensive weight loss program (ie, Weight Watchers, Noom, Bariatrics, other rivera on phone)? If so, what?: No  7. Does the patient have a history of type 2 diabetes?: No  8. Has the member tried and failed other weight loss medication within the past 12 months?: No  9. Will the member use requested medication in combination with another GLP agonist or weight loss drug?: No  10. Is the medication a controlled substance?: No     Baseline weight (in pounds): 375 lbs          Subjective:   Chief Complaint   Patient presents with    Sinusitis     Sinus congestion, clogged ears started over the weekend  COVID negative this morning     Follow-up     Review recent labs       Patient ID: David Ferguson is a 39  y.o. male.    Lat week started to feel sick, got progressively worse. Lots of mucus, now thick, yellow, green. Throat thick with post nasal drip. Sinus pressure. Coughing. Tried no OTC.    Also here to discuss lab results. Wife has been cooking healthy, more fiber, no bread. Down 5 lbs and notes his belly feels better and heartburn has improved.    Of note he wakes every morning feeling awful, no matter how much he sleeps, never rested.         The following portions of the patient's history were reviewed and updated as appropriate: allergies, current medications, past family history, past medical history, past social history, past surgical history, and problem list.    Past Medical History:   Diagnosis Date    Allergic     Fatty liver      Past Surgical History:   Procedure Laterality Date    KNEE SURGERY Right     right knee ACL reconstruction 1999    MRI/MRA HEAD (HISTORICAL)      [place] BIOPSY SOFT TISSUE NECK/THORAX N/A 11/4/2024    Procedure: EXCISION SOFT TISSUE MASS RT POSTERIOR NECK, LT UPPER BACK AND MID BACK;  Surgeon: Ezra Garay MD;  Location:  MAIN OR;  Service: General    CO EXCISION TUMOR SOFT TISSUE BACK/FLANK SUBQ <3CM N/A 11/4/2024    Procedure: EXCISION  BIOPSY LESION/MASS BACK LEFT UPPER BACK AND MID BACK;  Surgeon: Ezra Garay MD;  Location: [facility];  Service: General     Family History   Problem Relation Age of Onset    Appendicitis Mother     Other Mother         Kidney transplanted    Lupus Mother     Breast cancer Mother     Raynaud syndrome Mother     Kidney failure Mother     Depression Mother     Anxiety disorder Mother     No Known Problems Father     Depression Maternal Grandmother     Diabetes Other     Hearing loss Paternal Grandfather     Depression Brother     Alcohol abuse Neg Hx     Substance Abuse Neg Hx     Mental illness Neg Hx      Social History     Socioeconomic History    Marital status: /Civil Union     Spouse name: Not on file    Number of children: Not  on file    Years of education: Not on file    Highest education level: Not on file   Occupational History    Not on file   Tobacco Use    Smoking status: Former    Smokeless tobacco: Never    Tobacco comments:     Quit smoking 2010   Vaping Use    Vaping status: Never Used   Substance and Sexual Activity    Alcohol use: Not Currently     Comment: drinks beer 1-2 times a week     Drug use: No    Sexual activity: Not on file   Other Topics Concern    Not on file   Social History Narrative    Always uses seat belt    Daily caffeine consumption 1 serving a day    Has smoke detectors     Social Drivers of Health     Financial Resource Strain: Not on file   Food Insecurity: Not on file   Transportation Needs: Not on file   Physical Activity: Not on file   Stress: Not on file   Social Connections: Not on file   Intimate Partner Violence: Not on file   Housing Stability: Not on file       Current Outpatient Medications:     acetaminophen (TYLENOL) 325 mg tablet, Take 2 tablets (650 mg total) by mouth every 6 (six) hours as needed for mild pain or moderate pain, Disp: , Rfl:     fluticasone (FLONASE) 50 mcg/act nasal spray, 1 spray into each nostril daily, Disp: 18.2 mL, Rfl: 1    Multiple Vitamin (multivitamin) tablet, Take 1 tablet by mouth daily, Disp: , Rfl:     polyethylene glycol (MIRALAX) 17 g packet, Take 17 g by mouth daily As needed for constipation, Disp: 100 each, Rfl: 2    Review of Systems          Objective:    Vitals:    01/28/25 1052   BP: 136/88   Pulse: 78   Resp: 16   Temp: 97.8 °F (36.6 °C)   TempSrc: Temporal   SpO2: 98%   Weight: (!) 170 kg (375 lb)   Height: 6' (1.829 m)        Physical Exam      Physical Exam   Constitutional: Patient is oriented to person, place, and time.  appears well-developed and well-nourished.   HENT:   Head: Normocephalic and atraumatic.   Right Ear: Tympanic membrane, external ear and ear canal normal.   Left Ear: Tympanic membrane, external ear and ear canal normal.   Nose:  Mucosal edema present.   Mouth/Throat: Posterior oropharyngeal erythema present.   Turbinates inflamed with purulent mucus, pharynx with purulent post nasal drip and erythema   Eyes: Conjunctivae are normal.   Neck: Neck supple.   Cardiovascular: Normal rate, regular rhythm and normal heart sounds.    Pulmonary/Chest: Effort normal and breath sounds normal.   Lymphadenopathy: no cervical adenopathy.   Neurological: Patient is alert and oriented to person, place, and time.   Skin: Skin is warm.   Psychiatric: Patient has a normal mood and affect.

## 2025-01-29 ENCOUNTER — TELEPHONE (OUTPATIENT)
Age: 40
End: 2025-01-29

## 2025-01-29 ENCOUNTER — APPOINTMENT (OUTPATIENT)
Dept: LAB | Facility: CLINIC | Age: 40
End: 2025-01-29
Payer: COMMERCIAL

## 2025-01-29 ENCOUNTER — OFFICE VISIT (OUTPATIENT)
Dept: GASTROENTEROLOGY | Facility: CLINIC | Age: 40
End: 2025-01-29
Payer: COMMERCIAL

## 2025-01-29 VITALS
BODY MASS INDEX: 42.66 KG/M2 | DIASTOLIC BLOOD PRESSURE: 88 MMHG | WEIGHT: 315 LBS | TEMPERATURE: 97.2 F | SYSTOLIC BLOOD PRESSURE: 142 MMHG | HEIGHT: 72 IN

## 2025-01-29 DIAGNOSIS — R16.2 HEPATOSPLENOMEGALY: ICD-10-CM

## 2025-01-29 DIAGNOSIS — K21.9 GASTROESOPHAGEAL REFLUX DISEASE WITHOUT ESOPHAGITIS: ICD-10-CM

## 2025-01-29 DIAGNOSIS — R19.4 RECENT CHANGE IN FREQUENCY OF BOWEL MOVEMENTS: ICD-10-CM

## 2025-01-29 DIAGNOSIS — R10.12 LUQ PAIN: Primary | ICD-10-CM

## 2025-01-29 PROCEDURE — 86705 HEP B CORE ANTIBODY IGM: CPT

## 2025-01-29 PROCEDURE — 99214 OFFICE O/P EST MOD 30 MIN: CPT | Performed by: PHYSICIAN ASSISTANT

## 2025-01-29 PROCEDURE — 87340 HEPATITIS B SURFACE AG IA: CPT

## 2025-01-29 PROCEDURE — 36415 COLL VENOUS BLD VENIPUNCTURE: CPT

## 2025-01-29 PROCEDURE — 86704 HEP B CORE ANTIBODY TOTAL: CPT

## 2025-01-29 PROCEDURE — 86803 HEPATITIS C AB TEST: CPT

## 2025-01-29 RX ORDER — ESOMEPRAZOLE MAGNESIUM 40 MG/1
40 CAPSULE, DELAYED RELEASE ORAL
Qty: 30 CAPSULE | Refills: 3 | Status: SHIPPED | OUTPATIENT
Start: 2025-01-29

## 2025-01-29 NOTE — PROGRESS NOTES
Name: David Ferguson      : 1985      MRN: [id number]  Encounter Provider: Shandra Ortega PA-C  Encounter Date: 2025   Encounter department: [facility] [place]  :  Assessment & Plan  LUQ pain  Patient describes LUQ pain for the past month, since recovering from a bout of viral gastroenteritis.  I reviewed CT scan and labs, see below, overall unremarkable.  I suspect his pain may be related to gastritis or peptic ulcer, also suspicion for musculoskeletal pain given that it increases with sneezing and coughing.  Recommend trial of Nexium for 1 month.  Continue to limit NSAIDs.  If pain persists, could consider EGD.  I also recommended application of lidocaine patch to the affected area to help with any muscle or nerve related pain.    Orders:    Ambulatory Referral to Gastroenterology    esomeprazole (NexIUM) 40 MG capsule; Take 1 capsule (40 mg total) by mouth daily in the early morning    Recent change in frequency of bowel movements  Patient suffered a bout of viral gastroenteritis about 1 month ago. His stools have been mucousy, but have been normalizing over the past 1 to 2 weeks. I explained this is normal. No alarm signs/symptoms, such as anemia, rectal bleeding, abnormal weight loss, family history of colon cancer. If symptoms persist at his follow-up visit, would consider colonoscopy.  Hepatosplenomegaly  He has evidence of mild hepatosplenomegaly on recent CT scan which appears stable from prior imaging. There is moderate diffuse fatty infiltration which is most likely related to morbid obesity and prediabetes. He is not currently drinking alcohol.  Fortunately, fib 4 score is low, so he is low risk for significant fibrosis. We will check chronic hepatitis panel to rule out hepatitis C and B.  I agree with getting approval for Zepbound to help with weight loss.  I did explain hepatic steatosis can be reversible with weight loss.    Orders:    Chronic  Hepatitis Panel; Future    Gastroesophageal reflux disease without esophagitis  Patient with heartburn and regurgitation for the past few months, not currently on PPI.  Start trial of Nexium as discussed above.  Discussed in detail GERD diet and lifestyle modifications including importance of weight loss.  If symptoms persist at follow-up visit, would recommend EGD to evaluate for erosive esophagitis, hiatal hernia, Aguiar's esophagus.       Follow-up in 2 months        History of Present Illness   HPI  David Ferguson is a 39 y.o. male who with obesity BMI 50, CARLA depression, prediabetes, fatty liver, GERD, who presents for evaluation of left upper quadrant pain and change in bowel habits.    History obtained from: patient    Patient is new to me. He saw my partner in the office 1 year ago for abdominal pain, constipation, hepatomegaly.    Patient reports around the holidays, a GI bug ran through his house.  His family members had vomiting and diarrhea.  He had diarrhea which lasted 3 to 4 days.  Since then, he has had left upper quadrant pain.  The pain is constant and generally feels sore.  It is sore if he presses on the area.  It is also sore if he coughs or sneezes.  He has had heartburn and regurgitation for the past few months.  He also feels very gassy.  He changed his diet over the past 1 to 2 weeks and has been eating more fruits and vegetables, protein, and less carbohydrates.  Patient states his bowel movements have been more normal over the past week and have felt complete.  He denies any blood in the stool.  No unintentional weight loss.  He has been intentionally trying to lose weight and has already lost a few pounds.  He is waiting for approval for Zepbound.    He takes NSAIDs sparingly, mostly Tylenol if needed.    He earlier this month for left upper quadrant pain and change in bowel habits. Noncontrast CT of the abdomen and pelvis showed no acute findings-stable diffuse fatty infiltration  of the liver, stable mild hepatosplenomegaly, diverticulosis without diverticulitis.  Labs were grossly unremarkable including CMP, CBC, lipase, TSH.    No prior abdominal surgeries.     Objective   /88 (BP Location: Right arm, Patient Position: Sitting, Cuff Size: Adult)   Temp (!) 97.2 °F (36.2 °C) (Tympanic)   Ht 6' (1.829 m)   Wt (!) 169 kg (373 lb)   BMI 50.59 kg/m²      Physical Exam  Vitals and nursing note reviewed.   Constitutional:       General: He is not in acute distress.     Appearance: He is well-developed.   HENT:      Head: Normocephalic and atraumatic.   Eyes:      Conjunctiva/sclera: Conjunctivae normal.   Cardiovascular:      Rate and Rhythm: Normal rate and regular rhythm.      Heart sounds: No murmur heard.  Pulmonary:      Effort: Pulmonary effort is normal. No respiratory distress.      Breath sounds: Normal breath sounds.   Abdominal:      General: Abdomen is protuberant. Bowel sounds are normal.      Palpations: Abdomen is soft.      Tenderness: There is abdominal tenderness in the left upper quadrant.   Musculoskeletal:         General: No swelling.      Cervical back: Neck supple.   Skin:     General: Skin is warm and dry.      Capillary Refill: Capillary refill takes less than 2 seconds.   Neurological:      Mental Status: He is alert.   Psychiatric:         Mood and Affect: Mood normal.

## 2025-01-29 NOTE — ASSESSMENT & PLAN NOTE
Patient with heartburn and regurgitation for the past few months, not currently on PPI.  Start trial of Nexium as discussed above.  Discussed in detail GERD diet and lifestyle modifications including importance of weight loss.  If symptoms persist at follow-up visit, would recommend EGD to evaluate for erosive esophagitis, hiatal hernia, Aguiar's esophagus.

## 2025-01-29 NOTE — TELEPHONE ENCOUNTER
PA for Zepbound 2.5mg SUBMITTED to [employer]    via    []CMM-KEY:   [x]Surescripts-Case ID # [id number]   []Availity-Auth ID # NDC #   []Faxed to plan   []Other website   []Phone call Case ID #     [x]PA sent as URGENT    All office notes, labs and other pertaining documents and studies sent. Clinical questions answered. Awaiting determination from insurance company.     Turnaround time for your insurance to make a decision on your Prior Authorization can take 7-21 business days.

## 2025-01-30 ENCOUNTER — RESULTS FOLLOW-UP (OUTPATIENT)
Dept: GASTROENTEROLOGY | Facility: CLINIC | Age: 40
End: 2025-01-30

## 2025-01-30 LAB
HBV CORE AB SER QL: NORMAL
HBV CORE IGM SER QL: NORMAL
HBV SURFACE AG SER QL: NORMAL
HCV AB SER QL: NORMAL

## 2025-01-31 NOTE — TELEPHONE ENCOUNTER
PA for Zepbound 2.5mg DENIED    Reason:(Screenshot if applicable)        Message sent to office clinical pool Yes    Denial letter scanned into Media Yes    Appeal started No (Provider will need to decide if appeal is warranted and send clinical documentation to Prior Authorization Team for initiation.)    **Please follow up with your patient regarding denial and next steps**

## 2025-02-04 NOTE — TELEPHONE ENCOUNTER
Pt returned a call, no encounters. Pt states he would like to start the appeal process for Zepbound. Please advise

## 2025-02-04 NOTE — TELEPHONE ENCOUNTER
Please let provider know of patients request. I do not see patient has ever tried any of the preferred oral medications. Can provider let PA team know if we should proceed with an appeal? We would need justification why patient is unable to take/tolerate oral medications.

## 2025-02-04 NOTE — TELEPHONE ENCOUNTER
LM informing patient that Tanya recommends [facility] weight management. If interested we can place referral. Also informed that we have no additional information to provide to justify submitting an appeal.

## 2025-02-05 DIAGNOSIS — R16.0 HEPATOMEGALY: ICD-10-CM

## 2025-02-05 DIAGNOSIS — E66.01 MORBID OBESITY WITH BMI OF 40.0-44.9, ADULT (HCC): ICD-10-CM

## 2025-02-05 DIAGNOSIS — R73.03 PRE-DIABETES: ICD-10-CM

## 2025-02-05 DIAGNOSIS — K21.9 GASTROESOPHAGEAL REFLUX DISEASE WITHOUT ESOPHAGITIS: ICD-10-CM

## 2025-02-05 DIAGNOSIS — K76.0 FATTY LIVER: ICD-10-CM

## 2025-02-05 DIAGNOSIS — G47.33 OSA (OBSTRUCTIVE SLEEP APNEA): Primary | ICD-10-CM

## 2025-03-07 ENCOUNTER — OFFICE VISIT (OUTPATIENT)
Dept: FAMILY MEDICINE CLINIC | Facility: CLINIC | Age: 40
End: 2025-03-07
Payer: COMMERCIAL

## 2025-03-07 VITALS
RESPIRATION RATE: 16 BRPM | OXYGEN SATURATION: 97 % | TEMPERATURE: 97.9 F | SYSTOLIC BLOOD PRESSURE: 136 MMHG | HEIGHT: 72 IN | WEIGHT: 315 LBS | HEART RATE: 74 BPM | BODY MASS INDEX: 42.66 KG/M2 | DIASTOLIC BLOOD PRESSURE: 88 MMHG

## 2025-03-07 DIAGNOSIS — R05.1 ACUTE COUGH: ICD-10-CM

## 2025-03-07 DIAGNOSIS — J02.9 SORE THROAT: Primary | ICD-10-CM

## 2025-03-07 PROCEDURE — 87070 CULTURE OTHR SPECIMN AEROBIC: CPT | Performed by: NURSE PRACTITIONER

## 2025-03-07 PROCEDURE — 87880 STREP A ASSAY W/OPTIC: CPT | Performed by: NURSE PRACTITIONER

## 2025-03-07 PROCEDURE — 99213 OFFICE O/P EST LOW 20 MIN: CPT | Performed by: NURSE PRACTITIONER

## 2025-03-07 PROCEDURE — 87811 SARS-COV-2 COVID19 W/OPTIC: CPT | Performed by: NURSE PRACTITIONER

## 2025-03-07 NOTE — PROGRESS NOTES
Name: David Ferguson      : 1985      MRN: [id number]  Encounter Provider: KATHERINE Jimenez  Encounter Date: 3/7/2025   Encounter department: [facility]  :  Assessment & Plan  Sore throat    Orders:    POCT rapid ANTIGEN strepA    Rapid strep negative. Throat culture sent. Most likely d/t PND. Pt will start Flonase and Mucinex. Rest and fluids also encouraged. No need for antibiotics at this time, as I assume source is viral in nature. We reviewed that typically we delay antibiotics until after symptoms have not been resolved w/ OTC medications after 7-10 days. Patient is encouraged to call our office for any questions/concerns, persistent or worsening symptoms. Patient states they understand and agree with treatment plan.   Acute cough    Orders:    POCT Rapid Covid Ag    Most likely reflexive cough that is aggravated by PND. Plan as above.        Pt to f/u PRN.       History of Present Illness   Pt presents today for laryngitis, sore throat, cough, ear fullness x 2 days.  Denies fever, chills, body aches.  He has not been taking anything OTC.  Pt notes his wife had a sinus infection and laryngitis and he believe he may have the same thing.        Review of Systems  As noted per HPI.  Objective   /88   Pulse 74   Temp 97.9 °F (36.6 °C)   Resp 16   Ht 6' (1.829 m)   Wt (!) 163 kg (359 lb)   SpO2 97%   BMI 48.69 kg/m²      Physical Exam  Vitals reviewed.   Constitutional:       General: He is not in acute distress.     Appearance: He is well-developed. He is not ill-appearing.   HENT:      Right Ear: Tympanic membrane, ear canal and external ear normal.      Left Ear: Tympanic membrane, ear canal and external ear normal.      Nose: Congestion and rhinorrhea (clear) present.      Mouth/Throat:      Pharynx: No oropharyngeal exudate or posterior oropharyngeal erythema.   Cardiovascular:      Pulses: Normal pulses.      Heart sounds: Normal  heart sounds.   Pulmonary:      Effort: Pulmonary effort is normal.      Breath sounds: Normal breath sounds.   Lymphadenopathy:      Cervical: No cervical adenopathy.   Neurological:      Mental Status: He is alert and oriented to person, place, and time. Mental status is at baseline.   Psychiatric:         Mood and Affect: Mood normal.         Behavior: Behavior normal.         Thought Content: Thought content normal.         Judgment: Judgment normal.

## 2025-03-07 NOTE — PATIENT INSTRUCTIONS
Flonase nasal spray 2 sprays each nostril daily to help alleviate post nasal drip  Mucinex (plain) 1,200 mg twice a day with full glass of water.

## 2025-03-09 LAB — BACTERIA THROAT CULT: NORMAL

## 2025-03-10 ENCOUNTER — RESULTS FOLLOW-UP (OUTPATIENT)
Dept: FAMILY MEDICINE CLINIC | Facility: CLINIC | Age: 40
End: 2025-03-10

## 2025-03-10 LAB
S PYO AG THROAT QL: NEGATIVE
SARS-COV-2 AG UPPER RESP QL IA: NEGATIVE
VALID CONTROL: NORMAL

## 2025-03-26 ENCOUNTER — OFFICE VISIT (OUTPATIENT)
Dept: FAMILY MEDICINE CLINIC | Facility: CLINIC | Age: 40
End: 2025-03-26
Payer: COMMERCIAL

## 2025-03-26 ENCOUNTER — OFFICE VISIT (OUTPATIENT)
Age: 40
End: 2025-03-26
Payer: COMMERCIAL

## 2025-03-26 VITALS
RESPIRATION RATE: 16 BRPM | BODY MASS INDEX: 42.66 KG/M2 | OXYGEN SATURATION: 97 % | WEIGHT: 315 LBS | DIASTOLIC BLOOD PRESSURE: 90 MMHG | SYSTOLIC BLOOD PRESSURE: 142 MMHG | HEART RATE: 77 BPM | HEIGHT: 72 IN | TEMPERATURE: 97.8 F

## 2025-03-26 VITALS
HEART RATE: 73 BPM | TEMPERATURE: 97 F | WEIGHT: 315 LBS | BODY MASS INDEX: 42.66 KG/M2 | SYSTOLIC BLOOD PRESSURE: 142 MMHG | HEIGHT: 72 IN | DIASTOLIC BLOOD PRESSURE: 80 MMHG

## 2025-03-26 DIAGNOSIS — M25.561 ACUTE PAIN OF RIGHT KNEE: ICD-10-CM

## 2025-03-26 DIAGNOSIS — R73.03 PREDIABETES: Primary | ICD-10-CM

## 2025-03-26 DIAGNOSIS — E66.01 MORBID OBESITY WITH BMI OF 40.0-44.9, ADULT (HCC): ICD-10-CM

## 2025-03-26 DIAGNOSIS — R73.03 PRE-DIABETES: ICD-10-CM

## 2025-03-26 DIAGNOSIS — K76.0 FATTY LIVER: ICD-10-CM

## 2025-03-26 DIAGNOSIS — G47.33 OSA (OBSTRUCTIVE SLEEP APNEA): ICD-10-CM

## 2025-03-26 DIAGNOSIS — L73.9 FOLLICULITIS: Primary | ICD-10-CM

## 2025-03-26 DIAGNOSIS — R16.0 HEPATOMEGALY: ICD-10-CM

## 2025-03-26 DIAGNOSIS — K21.9 GASTROESOPHAGEAL REFLUX DISEASE WITHOUT ESOPHAGITIS: ICD-10-CM

## 2025-03-26 PROCEDURE — 99203 OFFICE O/P NEW LOW 30 MIN: CPT | Performed by: PHYSICIAN ASSISTANT

## 2025-03-26 PROCEDURE — 99213 OFFICE O/P EST LOW 20 MIN: CPT | Performed by: PHYSICIAN ASSISTANT

## 2025-03-26 RX ORDER — DOXYCYCLINE HYCLATE 100 MG
100 TABLET ORAL 2 TIMES DAILY
Qty: 14 TABLET | Refills: 0 | Status: SHIPPED | OUTPATIENT
Start: 2025-03-26 | End: 2025-04-02

## 2025-03-26 RX ORDER — METFORMIN HYDROCHLORIDE 500 MG/1
TABLET, EXTENDED RELEASE ORAL
Qty: 60 TABLET | Refills: 2 | Status: SHIPPED | OUTPATIENT
Start: 2025-03-26

## 2025-03-26 NOTE — PROGRESS NOTES
Name: David Ferguson      : 1985      MRN: [id number]  Encounter Provider: Tanya Landeros PA-C  Encounter Date: 3/26/2025   Encounter department: [facility]  :  Assessment & Plan  Folliculitis    Above scar from sebaceous cyst removal, possible new cyst, warm compresses to area, doxy bid x 7 days and monitor  Orders:    doxycycline hyclate (VIBRA-TABS) 100 mg tablet; Take 1 tablet (100 mg total) by mouth 2 (two) times a day for 7 days    Acute pain of right knee    Previous ACL repair 24 years prior, now with instability, crepitus, pain. Will refer to ortho  Orders:    Ambulatory Referral to Orthopedic Surgery; Future    F/u as needed and as scheduled       History of Present Illness   Last couple weeks started with pain. Started after running after dog when he got away. Now with pain daily, feels unstable with walking, sharp pain and feels like it will buckle. When he touches knee feels like a .  Tore ACL back at 15 yr old, had to have it surgically corrected at the time.     Also noted behind right had a lump, then felt a mass behind hear over surgical scar from removed sebaceous cyst. Got white material out of it and now improving. Wondering if cyst returned or something new. Has improved.      Review of Systems    Objective   /90   Pulse 77   Temp 97.8 °F (36.6 °C) (Temporal)   Resp 16   Ht 6' (1.829 m)   Wt (!) 161 kg (356 lb)   SpO2 97%   BMI 48.28 kg/m²      Physical Exam  Constitutional:       Appearance: Normal appearance. He is well-developed. He is obese.   HENT:      Head: Normocephalic and atraumatic.   Cardiovascular:      Rate and Rhythm: Normal rate and regular rhythm.      Pulses: Normal pulses.      Heart sounds: Normal heart sounds.   Pulmonary:      Effort: Pulmonary effort is normal.      Breath sounds: Normal breath sounds.   Musculoskeletal:         General: Normal range of motion.      Cervical back:  Normal range of motion and neck supple.   Skin:     General: Skin is warm.      Comments: Post auricular well healed surgical scar 5 mm above scar with flesh colored, slightly tender, papular lesion   Neurological:      General: No focal deficit present.      Mental Status: He is alert and oriented to person, place, and time.   Psychiatric:         Mood and Affect: Mood normal.         Behavior: Behavior normal.         Thought Content: Thought content normal.         Judgment: Judgment normal.

## 2025-03-26 NOTE — PROGRESS NOTES
Assessment/Plan:  David was seen today for consult.    Diagnoses and all orders for this visit:    Prediabetes  -     Hemoglobin A1C; Future    CARLA (obstructive sleep apnea)  -     Ambulatory Referral to Weight Management    Gastroesophageal reflux disease without esophagitis  -     Ambulatory Referral to Weight Management    Morbid obesity with BMI of 40.0-44.9, adult (HCC)  -     Ambulatory Referral to Weight Management  -     Ambulatory referral to Sleep Medicine; Future  -     metFORMIN (GLUCOPHAGE-XR) 500 mg 24 hr tablet; Start with 500 mg orally once daily with the evening meal increase in two weeks to 1000 mg (two tablets) if tolerated  -     Hemoglobin A1C; Future  -     Vitamin D 25 hydroxy; Future  -     Vitamin B12; Future    Pre-diabetes  -     Ambulatory Referral to Weight Management    Fatty liver  -     Ambulatory Referral to Weight Management    Hepatomegaly  -     Ambulatory Referral to Weight Management         - Discussed options of HealthyCORE-Intensive Lifestyle Intervention Program, Very Low Calorie Diet-VLCD, and Conservative Program and the role of weight loss medications.  - Explained the importance of making lifestyle changes first before starting anti-obesity medications.  - Patient should demonstrate lifestyle changes first before anti-obesity medication initiated.   - Patient is interested in pursuing Conservative Program  - Initial weight loss goal of 5-10% weight loss for improved health as studies have shown this is where we see the greatest impact on improving health and decreasing risk of obesity related conditions.  - Weight loss can improve patient's co-morbid conditions and/or prevent weight-related complications.  - Stop Bang 6/8  - Labs reviewed: As below.      General Recommendations:  Nutrition:  Eat breakfast daily.  Do not skip meals.     Food log (ie.) www.sambaash.com, sparkpeople.com, loseit.com, calorieking.com, etc.    Practice mindful eating.  Be sure to set  aside time to eat, eat slowly, and savor your food.    Hydration:    At least 64oz of water daily.  No sugar sweetened beverages.  No juice (eat the fruit instead).    Exercise:  Studies have shown that the ideal exercise goal is somewhere between 150 to 300 minutes of moderate intensity exercise a week.  Start with exercising 10 minutes every other day and gradually increase physical activity with a goal of at least 150 minutes of moderate intensity exercise a week, divided over at least 3 days a week.  An example of this would be exercising 30 minutes a day, 5 days a week.  Resistance training can increase muscle mass and increase our resting metabolic rate.   FULL BODY resistance training is recommended 2-3 times a week.  Do not do this on consecutive days to allow for muscle recovery.    Aim for a bare minimum 5000 steps, even on days you do not exercise.    Monitoring:   Weigh yourself daily.  If this causes undue stress, then just weigh yourself once a week.  Weigh yourself the same time of the day with the same amount of clothing on.  Preferably this should be done after waking up, before you eat, and with no clothing or minimal clothing on.    Calorie goal:  2200 leana/day (Provided with meal plan to follow).    Return visit:    Calorie tracking/deficit - continue calorie deficit  Physical activity goals  AOM  Insurance not covering GLP1  Not a candidate for phentermine/qsymia d/t periodic palpitations/chest pain and borderline HTN  Discussed trial of metformin for his prediabetes. Roderick update hemoglobin a1c in 3-4 months  RTC: 3-4 months      Total time spent reviewing chart, interviewing patient, examining patient, discussing plan, answering all questions, and documentin min.       ______________________________________________________________________        Subjective:   Chief Complaint   Patient presents with    Consult     Mwm consult       HPI: Leonardosinan Ferguson  is a 39 y.o. male with history of  CARLA, GERD, depression, prediabetes, and excess weight, here to pursue weight loss management.  Previous notes and records have been reviewed.    GERD - esomeprazole  Prediabetes - hemoglobin A1c 6.2  TSH WNL  Fatty liver disease per CT 1/2025       HPI  Wt Readings from Last 20 Encounters:   03/26/25 (!) 160 kg (353 lb 3.2 oz)   03/26/25 (!) 161 kg (356 lb)   03/07/25 (!) 163 kg (359 lb)   01/29/25 (!) 169 kg (373 lb)   01/28/25 (!) 170 kg (375 lb)   01/14/25 (!) 172 kg (380 lb)   11/13/24 (!) 171 kg (377 lb 9.6 oz)   11/04/24 (!) 171 kg (376 lb 6.4 oz)   10/02/24 (!) 168 kg (370 lb 3.2 oz)   09/16/24 (!) 168 kg (371 lb)   09/09/24 (!) 167 kg (368 lb)   01/03/24 (!) 167 kg (367 lb 9.6 oz)   01/02/24 (!) 166 kg (365 lb)   10/17/23 (!) 166 kg (367 lb)   07/14/23 (!) 165 kg (363 lb 1.6 oz)   03/10/23 (!) 163 kg (359 lb 3.2 oz)   01/23/23 (!) 162 kg (357 lb 6.4 oz)   01/12/23 (!) 154 kg (340 lb)   09/07/22 (!) 158 kg (348 lb 14.4 oz)   07/25/22 (!) 159 kg (349 lb 12.8 oz)     Weight History  Highest adult weight:: (Patient-Rptd) (P) 380 lbs  Lowest adult weight:: (Patient-Rptd) (P) 226 lbs  What is your goal weight?: (Patient-Rptd) (P) 230 lbs  How long have you struggled with maintaining a healthy weight?: (Patient-Rptd) (P) Childhood  What weight loss attempts have you had in the past?: (Patient-Rptd) (P) Diet, Exercise, Over the counter supplements  Which over the counter supplements have you tried?: (Patient-Rptd) (P) Various diet pills. None worked         Food History  Provide the time that you typically eat and common foods you eat for each meal/snack: : (Patient-Rptd) (P) Breakfast  Provide the time and common foods you eat for breakfast:: (Patient-Rptd) (P) 9 to 10 am eggs, granola bar, oatmeal.  How often do you go out to eat or have take out?: (Patient-Rptd) (P) Not often. 1 or 2 times a month.  Daily beverage intake, please select the beverages you consume daily and the amount(s):: (Patient-Rptd) (P) Water,  Tea, Coffee  How many cups of water?: (Patient-Rptd) (P) 10  How many cups of tea?: (Patient-Rptd) (P) 2  How many cups of coffee?: (Patient-Rptd) (P) 2  Do you consume alcohol?: (Patient-Rptd) (P) No    L: high protien (chicken/beef) + complex carbs (potatoes/peppers/broccoli/rice)  D: same as lunch    Physical Activity   How often do you exercise?: (Patient-Rptd) (P) 1 or 2 times a week. I have a very active job  How long are your activity sessions?: (Patient-Rptd) (P) 1 hour  What types of physical activity are you currently participating in?: (Patient-Rptd) (P) Cardio (elliptical, walking, running, biking, rowing, etc)    Sleep  How many hours of sleep are you getting per night?: (Patient-Rptd) (P) 7  How would you rate your quality of sleep?: (Patient-Rptd) (P) Fair  Do you have a history of sleep apnea?: (Patient-Rptd) (P) Yes  Is this being treated?: (Patient-Rptd) (P) No    Family/Social History  Do you have a family history of obesity?: (Patient-Rptd) (P) Yes  Who in your family?: (Patient-Rptd) (P) Grandfather, aunt, uncle    Gynecological History  If of childbearing age, are you using birth control?: (Patient-Rptd) (P) N/A  Menopausal status?: (Patient-Rptd) (P) N/A        Past Medical History:   Diagnosis Date    Allergic     Fatty liver      Patient denies personal and family history of  pancreatitis, thyroid cancer, MEN-2 tumors.  Denies any hx of glaucoma, seizures, kidney stones, gallstones.  Denies Hx of CAD, PAD, palpitations, arrhythmia.   Denies uncontrolled anxiety or depression, suicidal behavior or thinking , insomnia or sleep disturbance.     Past Surgical History:   Procedure Laterality Date    KNEE SURGERY Right     right knee ACL reconstruction 1999    MRI/MRA HEAD (HISTORICAL)      ID BIOPSY SOFT TISSUE NECK/THORAX N/A 11/4/2024    Procedure: EXCISION SOFT TISSUE MASS RT POSTERIOR NECK, LT UPPER BACK AND MID BACK;  Surgeon: Ezra Garay MD;  Location:  MAIN OR;  Service: General     "ID EXCISION TUMOR SOFT TISSUE BACK/FLANK SUBQ <3CM N/A 11/4/2024    Procedure: EXCISION  BIOPSY LESION/MASS BACK LEFT UPPER BACK AND MID BACK;  Surgeon: Ezra Garay MD;  Location:  MAIN OR;  Service: General     The following portions of the patient's history were reviewed and updated as appropriate: allergies, current medications, past family history, past medical history, past social history, past surgical history, and problem list.    Review Of Systems:  Review of Systems   Constitutional:  Negative for fatigue and fever.   HENT:  Negative for sore throat, trouble swallowing and voice change.    Respiratory:  Negative for shortness of breath.    Cardiovascular:  Negative for chest pain.   Gastrointestinal:  Negative for abdominal pain, constipation, diarrhea, nausea and vomiting.   Endocrine: Negative for cold intolerance and heat intolerance.   Genitourinary:  Negative for difficulty urinating.   Musculoskeletal:  Negative for arthralgias and back pain.   Psychiatric/Behavioral:  Negative for suicidal ideas. The patient is not nervous/anxious.    All other systems reviewed and are negative.      Objective:  /80 (Patient Position: Sitting, Cuff Size: Standard)   Pulse 73   Temp (!) 97 °F (36.1 °C) (Tympanic)   Ht 6' 0.13\" (1.832 m)   Wt (!) 160 kg (353 lb 3.2 oz)   BMI 47.74 kg/m²   Physical Exam  Vitals and nursing note reviewed.   Constitutional:       General: He is not in acute distress.     Appearance: Normal appearance. He is obese. He is not ill-appearing, toxic-appearing or diaphoretic.   HENT:      Head: Normocephalic and atraumatic.   Eyes:      General:         Right eye: No discharge.         Left eye: No discharge.      Conjunctiva/sclera: Conjunctivae normal.   Pulmonary:      Effort: Pulmonary effort is normal. No respiratory distress.   Musculoskeletal:         General: Normal range of motion.      Cervical back: Normal range of motion. No rigidity.      Right lower leg: No edema. "      Left lower leg: No edema.   Skin:     Coloration: Skin is not pale.      Findings: No erythema or rash.   Neurological:      General: No focal deficit present.      Mental Status: He is alert.   Psychiatric:         Mood and Affect: Mood normal.         Labs and Imaging  Recent labs and imaging have been personally reviewed.  Lab Results   Component Value Date    WBC 6.40 01/16/2025    HGB 16.2 01/16/2025    HCT 49.2 01/16/2025    MCV 84 01/16/2025     01/16/2025     Lab Results   Component Value Date     06/16/2017    SODIUM 138 01/16/2025    K 4.5 01/16/2025     01/16/2025    CO2 26 01/16/2025    AGAP 7 01/16/2025    BUN 21 01/16/2025    CREATININE 0.83 01/16/2025    GLUF 102 (H) 01/16/2025    CALCIUM 8.2 (L) 01/16/2025    AST 21 01/16/2025    ALT 33 01/16/2025    ALKPHOS 34 01/16/2025    PROT 7.0 06/16/2017    TP 7.4 01/16/2025    BILITOT 0.5 06/16/2017    TBILI 0.46 01/16/2025    EGFR 110 01/16/2025     Lab Results   Component Value Date    HGBA1C 6.2 (H) 01/16/2025     Lab Results   Component Value Date    IYS1FPKSIMEW 2.933 01/16/2025     Lab Results   Component Value Date    CHOLESTEROL 208 (H) 03/15/2023     Lab Results   Component Value Date    HDL 45 03/15/2023     Lab Results   Component Value Date    TRIG 271 (H) 03/15/2023     Lab Results   Component Value Date    LDLCALC 109 (H) 03/15/2023

## 2025-03-31 ENCOUNTER — NURSE TRIAGE (OUTPATIENT)
Age: 40
End: 2025-03-31

## 2025-03-31 NOTE — TELEPHONE ENCOUNTER
"FOLLOW UP: [phone]    REASON FOR CONVERSATION: Medication Problem    SYMPTOMS: PT started metformin 500mg daily Wed evening. Pt awoke next morning with \"sore\" neck, under jaw and on sides R>L. Reports neck started swelling more throughout next couple days and getting more painful. Endorsed painful swallowing and thought he was getting sick. Denied difficulty breathing or voice changes. PT also endorses drinking 8-10 bottles water/day at baseline, but felt thirstier than normal while taking metformin, and had darker urine. PT stopped metformin yesterday evening and woke up with decreased sxs.     PT speaking in clear complete sentences throughout call; no wheezing/stridor noted.     OTHER: PT interested in trying a new medication    DISPOSITION: Discuss With PCP and Callback by Nurse Within 1 Hour    Reason for Disposition   Caller has URGENT medicine question about med that PCP or specialist prescribed and triager unable to answer question    Answer Assessment - Initial Assessment Questions  1. NAME of MEDICINE: \"What medicine(s) are you calling about?\"      metformin  2. QUESTION: \"What is your question?\" (e.g., double dose of medicine, side effect)      Neck swelling  3. PRESCRIBER: \"Who prescribed the medicine?\" Reason: if prescribed by specialist, call should be referred to that group.      DANIEL Ceron PA-C  4. SYMPTOMS: \"Do you have any symptoms?\" If Yes, ask: \"What symptoms are you having?\"  \"How bad are the symptoms (e.g., mild, moderate, severe)      See note  5. PREGNANCY:  \"Is there any chance that you are pregnant?\" \"When was your last menstrual period?\"      N/a    Protocols used: Medication Question Call-Adult-[place]    "

## 2025-04-01 NOTE — TELEPHONE ENCOUNTER
Please have patient continue to hold metformin. ER precautions for difficulty breathing/swallowing

## 2025-04-18 ENCOUNTER — TELEPHONE (OUTPATIENT)
Age: 40
End: 2025-04-18

## 2025-04-18 NOTE — TELEPHONE ENCOUNTER
Patient called in to say he's been off Metformin for a few weeks now and feeling better.  He is wondering what his next step should be and when Eli would like to see him again.  Please call him at [phone], thanks.

## 2025-05-05 DIAGNOSIS — G47.33 OSA (OBSTRUCTIVE SLEEP APNEA): Primary | ICD-10-CM

## 2025-05-05 DIAGNOSIS — E66.01 MORBID OBESITY WITH BMI OF 40.0-44.9, ADULT (HCC): ICD-10-CM

## 2025-07-08 ENCOUNTER — OCCMED (OUTPATIENT)
Dept: URGENT CARE | Facility: CLINIC | Age: 40
End: 2025-07-08
Payer: OTHER MISCELLANEOUS

## 2025-07-08 ENCOUNTER — APPOINTMENT (OUTPATIENT)
Dept: RADIOLOGY | Facility: CLINIC | Age: 40
End: 2025-07-08
Payer: COMMERCIAL

## 2025-07-08 DIAGNOSIS — S69.92XA HAND INJURY, LEFT, INITIAL ENCOUNTER: ICD-10-CM

## 2025-07-08 DIAGNOSIS — S69.92XA HAND INJURY, LEFT, INITIAL ENCOUNTER: Primary | ICD-10-CM

## 2025-07-08 PROCEDURE — 73130 X-RAY EXAM OF HAND: CPT

## 2025-07-08 PROCEDURE — 99283 EMERGENCY DEPT VISIT LOW MDM: CPT

## 2025-07-08 PROCEDURE — 99213 OFFICE O/P EST LOW 20 MIN: CPT

## 2025-07-08 PROCEDURE — G0382 LEV 3 HOSP TYPE B ED VISIT: HCPCS

## 2025-07-14 ENCOUNTER — APPOINTMENT (OUTPATIENT)
Dept: URGENT CARE | Facility: CLINIC | Age: 40
End: 2025-07-14
Payer: OTHER MISCELLANEOUS

## 2025-07-14 PROCEDURE — 99213 OFFICE O/P EST LOW 20 MIN: CPT

## 2025-07-22 ENCOUNTER — HOSPITAL ENCOUNTER (EMERGENCY)
Facility: HOSPITAL | Age: 40
Discharge: HOME | End: 2025-07-22
Attending: EMERGENCY MEDICINE | Admitting: EMERGENCY MEDICINE
Payer: COMMERCIAL

## 2025-07-22 ENCOUNTER — APPOINTMENT (OUTPATIENT)
Dept: RADIOLOGY | Facility: CLINIC | Age: 40
End: 2025-07-22
Attending: EMERGENCY MEDICINE
Payer: COMMERCIAL

## 2025-07-22 ENCOUNTER — APPOINTMENT (OUTPATIENT)
Dept: URGENT CARE | Facility: CLINIC | Age: 40
End: 2025-07-22
Payer: OTHER MISCELLANEOUS

## 2025-07-22 ENCOUNTER — OFFICE VISIT (OUTPATIENT)
Dept: URGENT CARE | Facility: CLINIC | Age: 40
End: 2025-07-22
Payer: COMMERCIAL

## 2025-07-22 VITALS
SYSTOLIC BLOOD PRESSURE: 133 MMHG | RESPIRATION RATE: 20 BRPM | OXYGEN SATURATION: 93 % | DIASTOLIC BLOOD PRESSURE: 85 MMHG | HEART RATE: 69 BPM | TEMPERATURE: 98.5 F

## 2025-07-22 VITALS
TEMPERATURE: 98.4 F | HEART RATE: 88 BPM | OXYGEN SATURATION: 96 % | RESPIRATION RATE: 20 BRPM | DIASTOLIC BLOOD PRESSURE: 81 MMHG | SYSTOLIC BLOOD PRESSURE: 124 MMHG

## 2025-07-22 DIAGNOSIS — Z91.030 BEE STING ALLERGY: Primary | ICD-10-CM

## 2025-07-22 DIAGNOSIS — T78.40XA ALLERGIC REACTION, INITIAL ENCOUNTER: Primary | ICD-10-CM

## 2025-07-22 DIAGNOSIS — S69.92XA INJURY OF LEFT HAND, INITIAL ENCOUNTER: ICD-10-CM

## 2025-07-22 DIAGNOSIS — S69.92XA INJURY OF LEFT HAND, INITIAL ENCOUNTER: Primary | ICD-10-CM

## 2025-07-22 LAB — GLUCOSE SERPL-MCNC: 116 MG/DL (ref 65–140)

## 2025-07-22 PROCEDURE — 99213 OFFICE O/P EST LOW 20 MIN: CPT | Performed by: EMERGENCY MEDICINE

## 2025-07-22 PROCEDURE — 96372 THER/PROPH/DIAG INJ SC/IM: CPT | Performed by: EMERGENCY MEDICINE

## 2025-07-22 PROCEDURE — 99283 EMERGENCY DEPT VISIT LOW MDM: CPT

## 2025-07-22 PROCEDURE — 70030 X-RAY EYE FOR FOREIGN BODY: CPT

## 2025-07-22 PROCEDURE — 99214 OFFICE O/P EST MOD 30 MIN: CPT

## 2025-07-22 PROCEDURE — 96372 THER/PROPH/DIAG INJ SC/IM: CPT | Performed by: PHYSICIAN ASSISTANT

## 2025-07-22 PROCEDURE — 99284 EMERGENCY DEPT VISIT MOD MDM: CPT | Performed by: EMERGENCY MEDICINE

## 2025-07-22 PROCEDURE — 82948 REAGENT STRIP/BLOOD GLUCOSE: CPT | Performed by: PHYSICIAN ASSISTANT

## 2025-07-22 PROCEDURE — 99213 OFFICE O/P EST LOW 20 MIN: CPT | Performed by: PHYSICIAN ASSISTANT

## 2025-07-22 RX ORDER — EPINEPHRINE 0.3 MG/.3ML
0.3 INJECTION SUBCUTANEOUS ONCE
Qty: 0.6 ML | Refills: 0 | Status: SHIPPED | OUTPATIENT
Start: 2025-07-22 | End: 2025-07-22

## 2025-07-22 RX ORDER — METHYLPREDNISOLONE SODIUM SUCCINATE 125 MG/2ML
125 INJECTION, POWDER, LYOPHILIZED, FOR SOLUTION INTRAMUSCULAR; INTRAVENOUS ONCE
Status: COMPLETED | OUTPATIENT
Start: 2025-07-22 | End: 2025-07-22

## 2025-07-22 RX ORDER — DIPHENHYDRAMINE HCL 12.5 MG/5ML
50 SOLUTION ORAL ONCE
Status: DISCONTINUED | OUTPATIENT
Start: 2025-07-22 | End: 2025-07-22

## 2025-07-22 RX ORDER — DIPHENHYDRAMINE HYDROCHLORIDE 50 MG/ML
50 INJECTION, SOLUTION INTRAMUSCULAR; INTRAVENOUS ONCE
Status: COMPLETED | OUTPATIENT
Start: 2025-07-22 | End: 2025-07-22

## 2025-07-22 RX ORDER — EPINEPHRINE 1 MG/ML
0.3 INJECTION, SOLUTION, CONCENTRATE INTRAVENOUS ONCE
Status: COMPLETED | OUTPATIENT
Start: 2025-07-22 | End: 2025-07-22

## 2025-07-22 RX ADMIN — DIPHENHYDRAMINE HYDROCHLORIDE 50 MG: 50 INJECTION, SOLUTION INTRAMUSCULAR; INTRAVENOUS at 10:26

## 2025-07-22 RX ADMIN — EPINEPHRINE 0.3 MG: 1 INJECTION, SOLUTION, CONCENTRATE INTRAVENOUS at 10:24

## 2025-07-22 RX ADMIN — METHYLPREDNISOLONE SODIUM SUCCINATE 125 MG: 125 INJECTION, POWDER, LYOPHILIZED, FOR SOLUTION INTRAMUSCULAR; INTRAVENOUS at 10:25

## 2025-07-22 NOTE — PROGRESS NOTES
[facility]        NAME: David Ferguson is a 39 y.o. male  : 1985    MRN: [id number]  DATE: 2025  TIME: 10:32 AM    /81   Pulse 88   Temp 98.4 °F (36.9 °C)   Resp 20   SpO2 96%     Assessment and Plan   Bee sting allergy [Z91.030]  1. Bee sting allergy  EPINEPHrine PF (ADRENALIN) 1 mg/mL injection 0.3 mg    methylPREDNISolone sodium succinate (Solu-MEDROL) injection 125 mg    ECG 12 lead    Fingerstick Glucose (POCT)    diphenhydrAMINE (BENADRYL) injection 50 mg    DISCONTINUED: diphenhydrAMINE (BENADRYL) oral liquid 50 mg            Patient Instructions       Follow up with PCP in 3-5 days.  Proceed to  ER if symptoms worsen.    Chief Complaint     Chief Complaint   Patient presents with    Medical Problem     Pt was stung by a wasp on his face. Has moderate angioedema          History of Present Illness       Pt with bee sting lower lip 30- 4o mins ago   pt with swelling down into chin and enterior neck  tongue and throat feeling normal   pt with dizziness and lightheadedness         Review of Systems   Review of Systems   Constitutional: Negative.    HENT: Negative.     Eyes: Negative.    Respiratory: Negative.     Cardiovascular: Negative.    Gastrointestinal: Negative.    Endocrine: Negative.    Genitourinary: Negative.    Musculoskeletal: Negative.    Allergic/Immunologic: Negative.    Neurological:  Positive for dizziness and light-headedness.   Hematological: Negative.    Psychiatric/Behavioral: Negative.     All other systems reviewed and are negative.        Current Medications     Current Medications[1]    Current Allergies     Allergies as of 2025 - Reviewed 2025   Allergen Reaction Noted    Shellfish allergy - food allergy Anaphylaxis 2017    Amoxicillin Hives 2017    Cephalosporins Rash 10/24/2024    Pollen extract Allergic Rhinitis 2017            The following portions of the patient's history were reviewed and updated as  appropriate: allergies, current medications, past family history, past medical history, past social history, past surgical history and problem list.     Past Medical History[2]    Past Surgical History[3]    Family History[4]      Medications have been verified.        Objective   /81   Pulse 88   Temp 98.4 °F (36.9 °C)   Resp 20   SpO2 96%        Physical Exam     Physical Exam  Vitals and nursing note reviewed.   Constitutional:       Appearance: Normal appearance. He is normal weight.   HENT:      Head: Normocephalic and atraumatic.      Right Ear: Tympanic membrane, ear canal and external ear normal.      Left Ear: Tympanic membrane, ear canal and external ear normal.      Nose: Nose normal.      Mouth/Throat:      Mouth: Mucous membranes are moist.      Pharynx: Oropharynx is clear.      Comments: Lowerr lip significant swelling  chin swelling    tongue wnl     Eyes:      Extraocular Movements: Extraocular movements intact.      Conjunctiva/sclera: Conjunctivae normal.      Pupils: Pupils are equal, round, and reactive to light.       Cardiovascular:      Rate and Rhythm: Normal rate and regular rhythm.      Pulses: Normal pulses.      Heart sounds: Normal heart sounds.   Pulmonary:      Effort: Pulmonary effort is normal.      Breath sounds: Normal breath sounds.   Abdominal:      Palpations: Abdomen is soft.     Musculoskeletal:         General: Normal range of motion.      Cervical back: Normal range of motion and neck supple.     Skin:     General: Skin is warm.     Neurological:      Mental Status: He is alert and oriented to person, place, and time.     Psychiatric:         Behavior: Behavior normal.                          [1]   Current Outpatient Medications:     esomeprazole (NexIUM) 40 MG capsule, Take 1 capsule (40 mg total) by mouth daily in the early morning (Patient not taking: Reported on 3/26/2025), Disp: 30 capsule, Rfl: 3    fluticasone (FLONASE) 50 mcg/act nasal spray, 1 spray into  each nostril daily, Disp: 18.2 mL, Rfl: 1    metFORMIN (GLUCOPHAGE-XR) 500 mg 24 hr tablet, Start with 500 mg orally once daily with the evening meal increase in two weeks to 1000 mg (two tablets) if tolerated, Disp: 60 tablet, Rfl: 2    Multiple Vitamin (multivitamin) tablet, Take 1 tablet by mouth daily, Disp: , Rfl:     polyethylene glycol (MIRALAX) 17 g packet, Take 17 g by mouth daily As needed for constipation, Disp: 100 each, Rfl: 2  No current facility-administered medications for this visit.  [2]   Past Medical History:  Diagnosis Date    Allergic     Fatty liver    [3]   Past Surgical History:  Procedure Laterality Date    KNEE SURGERY Right     right knee ACL reconstruction 1999    MRI/MRA HEAD (HISTORICAL)      [place] BIOPSY SOFT TISSUE NECK/THORAX N/A 11/4/2024    Procedure: EXCISION SOFT TISSUE MASS RT POSTERIOR NECK, LT UPPER BACK AND MID BACK;  Surgeon: Ezra Garay MD;  Location: [facility];  Service: General    [place] EXCISION TUMOR SOFT TISSUE BACK/FLANK SUBQ <3CM N/A 11/4/2024    Procedure: EXCISION  BIOPSY LESION/MASS BACK LEFT UPPER BACK AND MID BACK;  Surgeon: Ezra Garay MD;  Location: [facility];  Service: General   [4]   Family History  Problem Relation Name Age of Onset    Appendicitis Mother Mayuri     Other Mother Mayuri         Kidney transplanted    Lupus Mother Mayuri     Breast cancer Mother Mayuri     Raynaud syndrome Mother Mayuri     Kidney failure Mother Mayuri     Depression Mother Mayuri     Anxiety disorder Mother Mayuri     No Known Problems Father      Depression Maternal Grandmother Marci     Diabetes Other Grandmother     Hearing loss Paternal Grandfather Rolando     Depression Brother Kiran     Alcohol abuse Neg Hx      Substance Abuse Neg Hx      Mental illness Neg Hx

## 2025-07-28 ENCOUNTER — OFFICE VISIT (OUTPATIENT)
Dept: SLEEP CENTER | Facility: HOSPITAL | Age: 40
End: 2025-07-28
Attending: PHYSICIAN ASSISTANT
Payer: COMMERCIAL

## 2025-07-28 VITALS
WEIGHT: 315 LBS | SYSTOLIC BLOOD PRESSURE: 148 MMHG | DIASTOLIC BLOOD PRESSURE: 90 MMHG | BODY MASS INDEX: 42.66 KG/M2 | HEIGHT: 72 IN | OXYGEN SATURATION: 98 % | HEART RATE: 68 BPM

## 2025-07-28 DIAGNOSIS — R29.818 SUSPECTED SLEEP APNEA: ICD-10-CM

## 2025-07-28 DIAGNOSIS — R03.0 ELEVATED BLOOD PRESSURE READING WITHOUT DIAGNOSIS OF HYPERTENSION: ICD-10-CM

## 2025-07-28 DIAGNOSIS — G47.19 EXCESSIVE DAYTIME SLEEPINESS: ICD-10-CM

## 2025-07-28 DIAGNOSIS — E66.01 MORBID OBESITY WITH BMI OF 40.0-44.9, ADULT (HCC): ICD-10-CM

## 2025-07-28 DIAGNOSIS — R06.83 SNORING: ICD-10-CM

## 2025-07-28 DIAGNOSIS — R51.9 MORNING HEADACHE: Primary | ICD-10-CM

## 2025-07-28 PROCEDURE — 99204 OFFICE O/P NEW MOD 45 MIN: CPT | Performed by: PSYCHIATRY & NEUROLOGY

## 2025-07-29 ENCOUNTER — HOSPITAL ENCOUNTER (OUTPATIENT)
Facility: HOSPITAL | Age: 40
Discharge: HOME | End: 2025-07-29
Attending: PSYCHIATRY & NEUROLOGY
Payer: COMMERCIAL

## 2025-07-29 DIAGNOSIS — R06.83 SNORING: ICD-10-CM

## 2025-07-29 DIAGNOSIS — G47.19 EXCESSIVE DAYTIME SLEEPINESS: ICD-10-CM

## 2025-07-29 DIAGNOSIS — R29.818 SUSPECTED SLEEP APNEA: ICD-10-CM

## 2025-07-29 PROCEDURE — G0399 HOME SLEEP TEST/TYPE 3 PORTA: HCPCS

## 2025-07-30 ENCOUNTER — APPOINTMENT (OUTPATIENT)
Dept: URGENT CARE | Facility: CLINIC | Age: 40
End: 2025-07-30
Payer: OTHER MISCELLANEOUS

## 2025-07-30 PROCEDURE — 99213 OFFICE O/P EST LOW 20 MIN: CPT | Performed by: EMERGENCY MEDICINE

## 2025-08-04 ENCOUNTER — OFFICE VISIT (OUTPATIENT)
Age: 40
End: 2025-08-04
Payer: COMMERCIAL

## 2025-08-04 VITALS
BODY MASS INDEX: 46.35 KG/M2 | TEMPERATURE: 97.5 F | HEART RATE: 84 BPM | SYSTOLIC BLOOD PRESSURE: 138 MMHG | WEIGHT: 315 LBS | DIASTOLIC BLOOD PRESSURE: 70 MMHG | RESPIRATION RATE: 18 BRPM

## 2025-08-04 DIAGNOSIS — R73.03 PRE-DIABETES: ICD-10-CM

## 2025-08-04 DIAGNOSIS — E66.01 MORBID OBESITY WITH BMI OF 40.0-44.9, ADULT (HCC): Primary | ICD-10-CM

## 2025-08-04 DIAGNOSIS — K76.0 FATTY LIVER: ICD-10-CM

## 2025-08-04 DIAGNOSIS — K21.9 GASTROESOPHAGEAL REFLUX DISEASE WITHOUT ESOPHAGITIS: ICD-10-CM

## 2025-08-04 PROCEDURE — 99213 OFFICE O/P EST LOW 20 MIN: CPT | Performed by: PHYSICIAN ASSISTANT

## 2025-08-06 ENCOUNTER — OFFICE VISIT (OUTPATIENT)
Dept: OBGYN CLINIC | Facility: CLINIC | Age: 40
End: 2025-08-06
Payer: OTHER MISCELLANEOUS

## 2025-08-06 VITALS — BODY MASS INDEX: 42.66 KG/M2 | HEIGHT: 72 IN | WEIGHT: 315 LBS

## 2025-08-06 DIAGNOSIS — S63.659A COMPLETE TEAR OF RADIAL COLLATERAL LIGAMENT OF METACARPOPHALANGEAL (MCP) JOINT OF FINGER: Primary | ICD-10-CM

## 2025-08-06 PROCEDURE — 99204 OFFICE O/P NEW MOD 45 MIN: CPT | Performed by: ORTHOPAEDIC SURGERY

## 2025-08-08 ENCOUNTER — RESULTS FOLLOW-UP (OUTPATIENT)
Dept: SLEEP CENTER | Facility: HOSPITAL | Age: 40
End: 2025-08-08

## 2025-08-08 ENCOUNTER — TELEPHONE (OUTPATIENT)
Age: 40
End: 2025-08-08

## 2025-08-08 DIAGNOSIS — G47.33 OSA (OBSTRUCTIVE SLEEP APNEA): Primary | ICD-10-CM

## 2025-08-08 DIAGNOSIS — E66.01 MORBID OBESITY WITH BMI OF 40.0-44.9, ADULT (HCC): Primary | ICD-10-CM

## 2025-08-08 PROCEDURE — 95806 SLEEP STUDY UNATT&RESP EFFT: CPT | Performed by: PSYCHIATRY & NEUROLOGY

## 2025-08-08 RX ORDER — TIRZEPATIDE 2.5 MG/.5ML
2.5 INJECTION, SOLUTION SUBCUTANEOUS WEEKLY
Qty: 2 ML | Refills: 0 | Status: SHIPPED | OUTPATIENT
Start: 2025-08-08 | End: 2025-08-08 | Stop reason: ALTCHOICE

## 2025-08-11 ENCOUNTER — PATIENT MESSAGE (OUTPATIENT)
Age: 40
End: 2025-08-11

## 2025-08-12 ENCOUNTER — APPOINTMENT (OUTPATIENT)
Dept: URGENT CARE | Facility: CLINIC | Age: 40
End: 2025-08-12
Payer: OTHER MISCELLANEOUS

## (undated) DEVICE — BETHLEHEM UNIVERSAL OUTPATIENT: Brand: CARDINAL HEALTH

## (undated) DEVICE — ANTIBACTERIAL UNDYED BRAIDED (POLYGLACTIN 910), SYNTHETIC ABSORBABLE SUTURE: Brand: COATED VICRYL

## (undated) DEVICE — GLOVE INDICATOR PI UNDERGLOVE SZ 6.5 BLUE

## (undated) DEVICE — INTENDED FOR TISSUE SEPARATION, AND OTHER PROCEDURES THAT REQUIRE A SHARP SURGICAL BLADE TO PUNCTURE OR CUT.: Brand: BARD-PARKER SAFETY BLADES SIZE 15, STERILE

## (undated) DEVICE — ELECTRODE BLADE MOD E-Z CLEAN 2.5IN 6.4CM -0012M

## (undated) DEVICE — CHLORAPREP HI-LITE 26ML ORANGE

## (undated) DEVICE — SUT MONOCRYL 4-0 PS-2 27 IN Y426H

## (undated) DEVICE — TIBURON SPLIT SHEET: Brand: CONVERTORS

## (undated) DEVICE — GLOVE SRG BIOGEL 6.5

## (undated) DEVICE — PLUMEPEN PRO 10FT

## (undated) DEVICE — EXOFIN PRECISION PEN HIGH VISCOSITY TOPICAL SKIN ADHESIVE: Brand: EXOFIN PRECISION PEN, 1G

## (undated) DEVICE — NEPTUNE E-SEP SMOKE EVACUATION PENCIL, COATED, 70MM BLADE, PUSH BUTTON SWITCH: Brand: NEPTUNE E-SEP

## (undated) DEVICE — GLOVE SRG BIOGEL 7.5